# Patient Record
Sex: MALE | Race: WHITE
[De-identification: names, ages, dates, MRNs, and addresses within clinical notes are randomized per-mention and may not be internally consistent; named-entity substitution may affect disease eponyms.]

---

## 2017-03-22 ENCOUNTER — HOSPITAL ENCOUNTER (OUTPATIENT)
Dept: HOSPITAL 17 - CLAB | Age: 82
End: 2017-03-22
Attending: INTERNAL MEDICINE
Payer: MEDICARE

## 2017-03-22 DIAGNOSIS — J42: Primary | ICD-10-CM

## 2017-03-22 PROCEDURE — 87206 SMEAR FLUORESCENT/ACID STAI: CPT

## 2017-03-22 PROCEDURE — 87015 SPECIMEN INFECT AGNT CONCNTJ: CPT

## 2017-03-22 PROCEDURE — 87205 SMEAR GRAM STAIN: CPT

## 2017-03-22 PROCEDURE — 87116 MYCOBACTERIA CULTURE: CPT

## 2017-03-22 PROCEDURE — 87070 CULTURE OTHR SPECIMN AEROBIC: CPT

## 2017-08-29 ENCOUNTER — HOSPITAL ENCOUNTER (OUTPATIENT)
Dept: HOSPITAL 17 - CLAB | Age: 82
End: 2017-08-29
Attending: INTERNAL MEDICINE
Payer: MEDICARE

## 2017-08-29 DIAGNOSIS — B96.5: ICD-10-CM

## 2017-08-29 DIAGNOSIS — B95.3: ICD-10-CM

## 2017-08-29 DIAGNOSIS — J42: Primary | ICD-10-CM

## 2017-08-29 PROCEDURE — 87186 SC STD MICRODIL/AGAR DIL: CPT

## 2017-08-29 PROCEDURE — 87077 CULTURE AEROBIC IDENTIFY: CPT

## 2017-08-29 PROCEDURE — 87205 SMEAR GRAM STAIN: CPT

## 2017-08-29 PROCEDURE — 87070 CULTURE OTHR SPECIMN AEROBIC: CPT

## 2017-09-05 ENCOUNTER — HOSPITAL ENCOUNTER (OUTPATIENT)
Dept: HOSPITAL 17 - HRSP | Age: 82
End: 2017-09-05
Attending: INTERNAL MEDICINE
Payer: MEDICARE

## 2017-09-05 DIAGNOSIS — J45.909: Primary | ICD-10-CM

## 2017-09-05 PROCEDURE — 94729 DIFFUSING CAPACITY: CPT

## 2017-09-05 PROCEDURE — 94726 PLETHYSMOGRAPHY LUNG VOLUMES: CPT

## 2017-09-05 PROCEDURE — 94620: CPT

## 2017-09-05 PROCEDURE — 94060 EVALUATION OF WHEEZING: CPT

## 2017-09-05 PROCEDURE — 95012 NITRIC OXIDE EXP GAS DETER: CPT

## 2017-09-13 NOTE — RSPPFT
DATE OF PROCEDURE:   9/5/17



COMMENTS:   



VOLUMES

DYNAMIC:    FVC mildly reduced; FEV1 moderately reduced.

STATIC:    TLC moderately reduced; FRC mildly reduced; RV normal.

FLOWS:    FEV1% moderately reduced; FEF 25-75 severely reduced.

DIFFUSION:    Moderately reduced.  

FLOW VOLUME

      LOOP:    Pattern of variable intrathoracic airways obstruction.



IMPRESSION:    

   

Moderately severe obstructive ventilator defect with reduction in diffusion. There is also 
a significant restrictive defect with a TLC of only 52%. There is improvement 
post-bronchodilator.

## 2017-10-23 ENCOUNTER — HOSPITAL ENCOUNTER (OUTPATIENT)
Dept: HOSPITAL 17 - CLAB | Age: 82
End: 2017-10-23
Attending: INTERNAL MEDICINE
Payer: MEDICARE

## 2017-10-23 DIAGNOSIS — J40: Primary | ICD-10-CM

## 2017-10-23 PROCEDURE — 87205 SMEAR GRAM STAIN: CPT

## 2017-10-23 PROCEDURE — 87070 CULTURE OTHR SPECIMN AEROBIC: CPT

## 2018-03-16 ENCOUNTER — HOSPITAL ENCOUNTER (OUTPATIENT)
Dept: HOSPITAL 17 - CLAB | Age: 83
End: 2018-03-16
Attending: INTERNAL MEDICINE
Payer: MEDICARE

## 2018-03-16 DIAGNOSIS — B96.89: ICD-10-CM

## 2018-03-16 DIAGNOSIS — J47.9: Primary | ICD-10-CM

## 2018-03-16 PROCEDURE — 87206 SMEAR FLUORESCENT/ACID STAI: CPT

## 2018-03-16 PROCEDURE — 87077 CULTURE AEROBIC IDENTIFY: CPT

## 2018-03-16 PROCEDURE — 87205 SMEAR GRAM STAIN: CPT

## 2018-03-16 PROCEDURE — 87070 CULTURE OTHR SPECIMN AEROBIC: CPT

## 2018-03-16 PROCEDURE — 87015 SPECIMEN INFECT AGNT CONCNTJ: CPT

## 2018-03-16 PROCEDURE — 87186 SC STD MICRODIL/AGAR DIL: CPT

## 2018-03-16 PROCEDURE — 87116 MYCOBACTERIA CULTURE: CPT

## 2018-03-16 PROCEDURE — 87102 FUNGUS ISOLATION CULTURE: CPT

## 2018-06-05 ENCOUNTER — HOSPITAL ENCOUNTER (INPATIENT)
Dept: HOSPITAL 17 - NEPE | Age: 83
LOS: 3 days | Discharge: HOME | DRG: 190 | End: 2018-06-08
Attending: FAMILY MEDICINE | Admitting: FAMILY MEDICINE
Payer: MEDICARE

## 2018-06-05 VITALS
HEART RATE: 78 BPM | DIASTOLIC BLOOD PRESSURE: 54 MMHG | OXYGEN SATURATION: 97 % | SYSTOLIC BLOOD PRESSURE: 92 MMHG | RESPIRATION RATE: 17 BRPM

## 2018-06-05 VITALS
SYSTOLIC BLOOD PRESSURE: 102 MMHG | TEMPERATURE: 98.1 F | OXYGEN SATURATION: 99 % | DIASTOLIC BLOOD PRESSURE: 55 MMHG | RESPIRATION RATE: 18 BRPM | HEART RATE: 70 BPM

## 2018-06-05 VITALS
SYSTOLIC BLOOD PRESSURE: 102 MMHG | HEART RATE: 84 BPM | OXYGEN SATURATION: 96 % | TEMPERATURE: 98.9 F | DIASTOLIC BLOOD PRESSURE: 48 MMHG | RESPIRATION RATE: 20 BRPM

## 2018-06-05 VITALS
DIASTOLIC BLOOD PRESSURE: 55 MMHG | SYSTOLIC BLOOD PRESSURE: 82 MMHG | OXYGEN SATURATION: 96 % | TEMPERATURE: 99.3 F | RESPIRATION RATE: 19 BRPM | HEART RATE: 82 BPM

## 2018-06-05 VITALS — WEIGHT: 263.45 LBS | HEIGHT: 72 IN | BODY MASS INDEX: 35.68 KG/M2

## 2018-06-05 VITALS
HEART RATE: 79 BPM | OXYGEN SATURATION: 97 % | DIASTOLIC BLOOD PRESSURE: 56 MMHG | RESPIRATION RATE: 18 BRPM | SYSTOLIC BLOOD PRESSURE: 105 MMHG

## 2018-06-05 VITALS — OXYGEN SATURATION: 99 %

## 2018-06-05 DIAGNOSIS — M54.16: ICD-10-CM

## 2018-06-05 DIAGNOSIS — I25.10: ICD-10-CM

## 2018-06-05 DIAGNOSIS — E78.5: ICD-10-CM

## 2018-06-05 DIAGNOSIS — J44.0: Primary | ICD-10-CM

## 2018-06-05 DIAGNOSIS — R91.1: ICD-10-CM

## 2018-06-05 DIAGNOSIS — M85.80: ICD-10-CM

## 2018-06-05 DIAGNOSIS — Z96.1: ICD-10-CM

## 2018-06-05 DIAGNOSIS — D69.6: ICD-10-CM

## 2018-06-05 DIAGNOSIS — B96.5: ICD-10-CM

## 2018-06-05 DIAGNOSIS — J44.1: ICD-10-CM

## 2018-06-05 DIAGNOSIS — Z79.01: ICD-10-CM

## 2018-06-05 DIAGNOSIS — Z95.0: ICD-10-CM

## 2018-06-05 DIAGNOSIS — A31.9: ICD-10-CM

## 2018-06-05 DIAGNOSIS — Z86.74: ICD-10-CM

## 2018-06-05 DIAGNOSIS — I95.9: ICD-10-CM

## 2018-06-05 DIAGNOSIS — E80.4: ICD-10-CM

## 2018-06-05 DIAGNOSIS — M15.9: ICD-10-CM

## 2018-06-05 DIAGNOSIS — Z87.891: ICD-10-CM

## 2018-06-05 DIAGNOSIS — D53.9: ICD-10-CM

## 2018-06-05 DIAGNOSIS — I35.0: ICD-10-CM

## 2018-06-05 DIAGNOSIS — Z98.41: ICD-10-CM

## 2018-06-05 DIAGNOSIS — E78.00: ICD-10-CM

## 2018-06-05 DIAGNOSIS — G47.33: ICD-10-CM

## 2018-06-05 DIAGNOSIS — J15.1: ICD-10-CM

## 2018-06-05 DIAGNOSIS — K21.9: ICD-10-CM

## 2018-06-05 DIAGNOSIS — M54.12: ICD-10-CM

## 2018-06-05 DIAGNOSIS — R63.4: ICD-10-CM

## 2018-06-05 DIAGNOSIS — Z98.42: ICD-10-CM

## 2018-06-05 DIAGNOSIS — I27.20: ICD-10-CM

## 2018-06-05 DIAGNOSIS — M51.9: ICD-10-CM

## 2018-06-05 DIAGNOSIS — I48.2: ICD-10-CM

## 2018-06-05 LAB
ALBUMIN SERPL-MCNC: 2.9 GM/DL (ref 3.4–5)
ALP SERPL-CCNC: 62 U/L (ref 45–117)
ALT SERPL-CCNC: 14 U/L (ref 12–78)
AST SERPL-CCNC: 9 U/L (ref 15–37)
BASOPHILS # BLD AUTO: 0 TH/MM3 (ref 0–0.2)
BASOPHILS NFR BLD: 0.2 % (ref 0–2)
BILIRUB INDIRECT SERPL-MCNC: 1 MG/DL (ref 0–0.8)
BILIRUB SERPL-MCNC: 1.5 MG/DL (ref 0.2–1)
BUN SERPL-MCNC: 41 MG/DL (ref 7–18)
CALCIUM SERPL-MCNC: 8.1 MG/DL (ref 8.5–10.1)
CHLORIDE SERPL-SCNC: 106 MEQ/L (ref 98–107)
COLOR UR: YELLOW
CREAT SERPL-MCNC: 1.81 MG/DL (ref 0.6–1.3)
DIRECT BILIRUBIN ADULT: 0.5 MG/DL (ref 0–0.2)
EOSINOPHIL # BLD: 0 TH/MM3 (ref 0–0.4)
EOSINOPHIL NFR BLD: 0.1 % (ref 0–4)
ERYTHROCYTE [DISTWIDTH] IN BLOOD BY AUTOMATED COUNT: 18.2 % (ref 11.6–17.2)
GFR SERPLBLD BASED ON 1.73 SQ M-ARVRAT: 36 ML/MIN (ref 89–?)
GLUCOSE SERPL-MCNC: 121 MG/DL (ref 74–106)
GLUCOSE UR STRIP-MCNC: (no result) MG/DL
HCO3 BLD-SCNC: 21.5 MEQ/L (ref 21–32)
HCT VFR BLD CALC: 23 % (ref 39–51)
HGB BLD-MCNC: 7.8 GM/DL (ref 13–17)
HGB UR QL STRIP: (no result)
INR PPP: 1.2 RATIO
KETONES UR STRIP-MCNC: (no result) MG/DL
LYMPHOCYTES # BLD AUTO: 1.1 TH/MM3 (ref 1–4.8)
LYMPHOCYTES NFR BLD AUTO: 10.5 % (ref 9–44)
MCH RBC QN AUTO: 35.4 PG (ref 27–34)
MCHC RBC AUTO-ENTMCNC: 34 % (ref 32–36)
MCV RBC AUTO: 104.1 FL (ref 80–100)
MONOCYTE #: 1.4 TH/MM3 (ref 0–0.9)
MONOCYTES NFR BLD: 13.4 % (ref 0–8)
MUCOUS THREADS #/AREA URNS LPF: (no result) /LPF
NEUTROPHILS # BLD AUTO: 7.7 TH/MM3 (ref 1.8–7.7)
NEUTROPHILS NFR BLD AUTO: 75.8 % (ref 16–70)
NITRITE UR QL STRIP: (no result)
PLATELET # BLD: 158 TH/MM3 (ref 150–450)
PMV BLD AUTO: 9.7 FL (ref 7–11)
PROT SERPL-MCNC: 6.2 GM/DL (ref 6.4–8.2)
PROTHROMBIN TIME: 12 SEC (ref 9.8–11.6)
RBC # BLD AUTO: 2.21 MIL/MM3 (ref 4.5–5.9)
SODIUM SERPL-SCNC: 139 MEQ/L (ref 136–145)
SP GR UR STRIP: 1.01 (ref 1–1.03)
TROPONIN I SERPL-MCNC: 0.04 NG/ML (ref 0.02–0.05)
URINE LEUKOCYTE ESTERASE: (no result)
WBC # BLD AUTO: 10.2 TH/MM3 (ref 4–11)

## 2018-06-05 PROCEDURE — 80048 BASIC METABOLIC PNL TOTAL CA: CPT

## 2018-06-05 PROCEDURE — 86920 COMPATIBILITY TEST SPIN: CPT

## 2018-06-05 PROCEDURE — 87015 SPECIMEN INFECT AGNT CONCNTJ: CPT

## 2018-06-05 PROCEDURE — 85025 COMPLETE CBC W/AUTO DIFF WBC: CPT

## 2018-06-05 PROCEDURE — 80053 COMPREHEN METABOLIC PANEL: CPT

## 2018-06-05 PROCEDURE — 84484 ASSAY OF TROPONIN QUANT: CPT

## 2018-06-05 PROCEDURE — 71045 X-RAY EXAM CHEST 1 VIEW: CPT

## 2018-06-05 PROCEDURE — 94664 DEMO&/EVAL PT USE INHALER: CPT

## 2018-06-05 PROCEDURE — 81001 URINALYSIS AUTO W/SCOPE: CPT

## 2018-06-05 PROCEDURE — 87040 BLOOD CULTURE FOR BACTERIA: CPT

## 2018-06-05 PROCEDURE — 94668 MNPJ CHEST WALL SBSQ: CPT

## 2018-06-05 PROCEDURE — 87070 CULTURE OTHR SPECIMN AEROBIC: CPT

## 2018-06-05 PROCEDURE — 82550 ASSAY OF CK (CPK): CPT

## 2018-06-05 PROCEDURE — 36430 TRANSFUSION BLD/BLD COMPNT: CPT

## 2018-06-05 PROCEDURE — 86900 BLOOD TYPING SEROLOGIC ABO: CPT

## 2018-06-05 PROCEDURE — 86901 BLOOD TYPING SEROLOGIC RH(D): CPT

## 2018-06-05 PROCEDURE — 87205 SMEAR GRAM STAIN: CPT

## 2018-06-05 PROCEDURE — 71250 CT THORAX DX C-: CPT

## 2018-06-05 PROCEDURE — 83605 ASSAY OF LACTIC ACID: CPT

## 2018-06-05 PROCEDURE — 85610 PROTHROMBIN TIME: CPT

## 2018-06-05 PROCEDURE — 86850 RBC ANTIBODY SCREEN: CPT

## 2018-06-05 PROCEDURE — 93005 ELECTROCARDIOGRAM TRACING: CPT

## 2018-06-05 PROCEDURE — 85730 THROMBOPLASTIN TIME PARTIAL: CPT

## 2018-06-05 PROCEDURE — 87116 MYCOBACTERIA CULTURE: CPT

## 2018-06-05 PROCEDURE — 96361 HYDRATE IV INFUSION ADD-ON: CPT

## 2018-06-05 PROCEDURE — 94640 AIRWAY INHALATION TREATMENT: CPT

## 2018-06-05 PROCEDURE — 87206 SMEAR FLUORESCENT/ACID STAI: CPT

## 2018-06-05 PROCEDURE — 80076 HEPATIC FUNCTION PANEL: CPT

## 2018-06-05 PROCEDURE — 96360 HYDRATION IV INFUSION INIT: CPT

## 2018-06-05 PROCEDURE — P9016 RBC LEUKOCYTES REDUCED: HCPCS

## 2018-06-05 PROCEDURE — 94667 MNPJ CHEST WALL 1ST: CPT

## 2018-06-05 PROCEDURE — 30233N1 TRANSFUSION OF NONAUTOLOGOUS RED BLOOD CELLS INTO PERIPHERAL VEIN, PERCUTANEOUS APPROACH: ICD-10-PCS | Performed by: EMERGENCY MEDICINE

## 2018-06-05 RX ADMIN — APIXABAN SCH MG: 5 TABLET, FILM COATED ORAL at 21:01

## 2018-06-05 RX ADMIN — IPRATROPIUM BROMIDE AND ALBUTEROL SULFATE SCH AMPULE: .5; 3 SOLUTION RESPIRATORY (INHALATION) at 20:07

## 2018-06-05 RX ADMIN — Medication SCH ML: at 19:25

## 2018-06-05 RX ADMIN — BUDESONIDE AND FORMOTEROL FUMARATE DIHYDRATE SCH PUFF: 160; 4.5 AEROSOL RESPIRATORY (INHALATION) at 21:01

## 2018-06-05 NOTE — MH
cc:

Liam Esparza MD, Jason R MD

****

 

 

DATE OF ADMISSION:

06/05/2018

 

ADMITTING DIAGNOSIS:

Anemia, generalized weakness and fever.

 

HISTORY OF PRESENT ILLNESS:

This 83-year-old white male has a history of chronic chest congestion 

and cough, as well as aortic stenosis, chronic atrial fibrillation, 

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  The 

patient was seen yesterday in the undersigned physician's office and 

was not complaining of anything new.  He was complaining of his 

persistent congestion, which seemed to actually have been improved 

recently after he completed a course of tobramycin nebulized solution.

 The patient, however, overnight he woke up feeling very weak.  He had

shaking chills.  He went to do a nebulizer treatment and sat in a 

chair.  He was unable to get up from the chair, so he contacted EMS 

and, at that time, EMS arrived.  They took his temperature and it was 

101 degrees Fahrenheit.  They did help him up to bed, but he refused 

to go to the emergency department.  In the morning, the patient  

reported that he felt weak and he went back to his chair and was still

unable to get up out of the chair without assistance.  At that time, 

home health came to see the patient and the home health nurse reported

to the undersigned physician the patient's change in condition, at 

which point, the patient was instructed to contact EMS and be 

transferred to this facility for further evaluation and treatment.  

The patient reports that he has had this persistent cough, but denies 

any shortness of breath.  He has had no chest pain, palpitations, 

lightheadedness, dizziness, nausea, vomiting.  His bowels are moving 

well without any diarrhea or constipation.  He has had no melena or 

hematochezia.  He denies any GERD symptoms.  He does have his diffuse 

arthritic pains in the shoulders, knees, and wrists.  He also has 

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 

regular basis.  He does receive a Burans patch, which he applies every

7 days to help with the pain.  The patient has had no lateralizing 

deficits.  No syncope, near syncope, visual changes, difficulty 

swallowing or difficulty speaking.

 

The patient does have a history of anemia, which was a macrocytic 

anemia.  He underwent a complete evaluation by Dr. Rust including

a bone marrow biopsy, but there was no definite underlying etiology.  

He was recently initiated on Procrit.  He has received 2 doses and was

scheduled for repeat blood work in the next week.  The patient has had

a significant weight loss of over 20 pounds in the last 6 months.  He 

states his appetite has been diminished.  He has felt weaker overall 

over the last 6 months.

 

PAST MEDICAL HISTORY:

Significant for chronic atrial fibrillation.  He is status post a 

failed cardioversion.  He is followed by Dr. Marrero. He has a dual 

chamber pacemaker due to a history of asystole.  He has moderate to 

severe aortic stenosis and has been recommended to undergo evaluation 

for possible aortic valve repair; however, this has not been 

accomplished due to the patient's persistent lung infection.  He has 

COPD with bronchiectasis.  The patient is followed by Dr. Nathan Deng

for pulmonology.  He did have a sputum culture done on 03/16/2018 

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  The patient recently was treated with aerosolized 

tobramycin and was to start ciprofloxacin today.  He has lumbar disk 

disease and DJD as well as cervical disk disease and DJD.  He has 

undergone epidural steroid injections in the past.  He has a history 

of hyperlipidemia, sleep apnea which he was diagnosed with in 2003.  

He is followed by Dr. Camilo.  He has not recently been using his CPAP 

regularly.  He has a history of Gilbert's disease with a chronically 

elevated total bilirubin, hypogonadism, osteoarthritis, chronic 

thrombocytopenia, osteopenia, history of compression fracture of the 

spine, pulmonary hypertension,

and primary generalized osteoarthritis.  He is status post a sinus 

surgery, status post tonsillectomy,  status post right toe surgery and

he had bilateral cataract removal with lens implants.

 

CURRENT MEDICATIONS:

1.  Voltaren gel 1% 4 grams applied to the affected joints, 3 times a 

day as needed

2.  Butrans patch 10 mcg per hour, apply 1 patch every 7 days.

3.  Hydrocodone/acetaminophen 10/325 mg 1/2 tablet 3 times a day as 

needed for breakthrough pain.

4.  Advair Diskus 500/50 mcg 1 inhalation twice daily.

5.  Rosuvastatin 5 mg daily.

6.  Torsemide 20 mg daily.

7.  Incruse Ellipta 62.5 mcg per inhalation, 1 inhalation daily.

8.  Mucinex 600 mg once a day.

9.  Proventil inhaler 2 puffs 4 times a day as needed.

10.  Eliquis 5 mg twice a day.

11.  Potassium chloride ER 20 mEq daily.

12.  Singulair 10 mg daily.

13.  Loratadine 10 mg daily.

14.  Albuterol ipratropium bromide nebulizer solution 2.5 - 0.5 mg/3 

mL, 3 mL 4 times a day as needed.

15.  Vitamin D 2000 International Units daily.

16.  Centrum Silver 1 tablet daily

 

ALLERGIES:

HE HAS NO KNOWN DRUG ALLERGIES.

 

FAMILY HISTORY:

Noncontributory.

 

SOCIAL HISTORY:

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  He was in the

[employer] in the past.  He was also an .  He is 

currently  and lives with his wife.  He smoked for many years, 

but has not smoked in over 10 years and he currently does not consume 

alcohol.

 

REVIEW OF SYSTEMS:

Negative except as outlined above.

 

PHYSICAL EXAMINATION:

VITAL SIGNS:  Upon arrival to the emergency department, the patient's 

blood pressure was 102/55 with a heart rate of 70, respirations 18, 

temperature 98.1 degrees Fahrenheit, oxygen saturation on room air is 

99%.

GENERAL:  This is a well-nourished, elderly white male lying in bed, 

appearing weak, but in no acute respiratory distress.

HEENT:  Pupils equal, round, react to light.  Extraocular muscles 

intact.  Sclerae are anicteric.  Conjunctivae are pale.  The bilateral

lens implant is in place.  Nares patent without discharge.  

Mouth/throat reveal moist mucous membranes.  No erythema or exudates. 

Dentition is good.

NECK:  Supple without lymphadenopathy, JVD, bruits or thyromegaly.

CARDIOVASCULAR:  Shows a regular rate and rhythm with a 3-4/6 systolic

murmur heard best at the right upper sternal border and a 2/6 systolic

murmur heard best at the left lower sternal border radiating to apex. 

There are distant heart sounds.

LUNGS:  Reveal diffuse rhonchi,  end expiratory wheezes, diminished 

air exchange, but no rales.

ABDOMEN:  Soft, nontender, nondistended with bowel sounds present.  No

mass felt.  No hepatosplenomegaly.

GENITOURINARY AND RECTAL:  Deferred.

LOWER EXTREMITIES:  Reveal 1+ edema of the distal lower legs and 

ankles, but no calf tenderness or Homans sign.  Intact distal pulses.

SKIN:  Warm and dry, but pale.  Turgor good.

NEUROLOGIC:  The patient is awake and alert, oriented x3.  Speech is 

intact.  Cranial nerves intact.  No lateralizing deficits.  No 

short-term memory or cognitive deficits.  Gait was not tested.  Mood 

good.  Affect appropriate.

 

LABORATORY STUDIES:

White blood cell count is 10.2, hemoglobin 7.8, hematocrit 23.0, MCV 

elevated at 104.1, platelet count 158,000 which is within normal 

limits.  The basic metabolic profile was significant for a BUN of 41, 

creatinine 1.81.  Calcium was low at 8.1, but this was not protein 

corrected.  Liver function studies revealed a total bilirubin of 1.5 

with an indirect bilirubin of 1.0.  The total protein was low at 6.2, 

albumin low at 2.9.  CK is 34.  Troponin 0.04.  INR 1.2, APTT 34.5.  

Urinalysis revealed a specific gravity 1.013, pH 5.5, trace leukocyte 

esterase, 1 RBC, 3 WBCs.  Culture not indicated.  Blood cultures 

pending.

 

IMAGING STUDIES:

Chest x-ray reveals single AP view,  lungs are grossly clear.

 

ASSESSMENT AND PLAN:

1.  This 83-year-old white male presents to the emergency department 

with profound weakness, fever up to 101 degrees yesterday and anemia. 

At this point, the patient has been ordered 1 unit of packed red blood

cells by the emergency room physician.  We will transfuse the 1 unit 

and possibly a second tomorrow.  The patient has had a temperature up 

to 101 degrees Fahrenheit, but the urine is negative.  The only likely

source is his lungs.  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 

mycobacterium infection of the lungs as well as bronchiectasis with 

Pseudomonas infection.  I have consulted Dr. Nathan Deng to assist 

with management of his pulmonary condition and I have consulted 

infectious disease for assistance with a choice of antibiotics.  We 

will place the patient on nebulizer treatments and IV steroids.  He 

currently has good oxygen saturations so will not need any 

supplemental oxygen.

2.  Anemia.  The patient underwent an extensive evaluation for this 

macrocytic anemia and had just started Procrit injections.  We will 

transfuse the 1 unit of packed red blood cells and possibly a second 

tomorrow.

3.  Chronic atrial fibrillation.  The patient's ventricular rate is 

well controlled.  He continues on Eliquis for prophylaxis against 

embolism. I have consulted Dr. Marrero for assistance with management 

of his cardiac disease.

4.  Aortic stenosis.  The patient has moderate to severe aortic 

stenosis.  He will continue with the torsemide.  Blood pressure was 

low earlier today with systolic readings in the 80s and 90s.  We will 

monitor blood pressure as the patient receives the transfusion.  We 

will defer any further treatment of the aortic stenosis to Dr. Marrero.

5.  Cervical lumbar disk disease and primary generalized 

osteoarthritis.  The patient continues with the Butrans patch. His 

patch is due to be changed on Sunday, so we will not need to change at

this time.  I have held any medication for breakthrough pain until we 

assess the patient's respiratory status better.

6.  Hyperlipidemia.  The patient normally takes rosuvastatin.  He will

be changed to pravastatin during the hospitalization, due to hospital 

formulary restrictions.

7.  Chronic obstructive pulmonary disease.  The patient is going to 

continue with Incruse and Advair.  I have placed him on the steroids 

and we will await Dr. Deng's input to determine if any additional 

treatment is needed.  We will also speak with infectious disease about

antibiotic choice.

 

I have explained the patient's condition and the plan of care to the 

patient.  He expressed understanding and agreement.

 

 

__________________________________

MD ULISES Mckeon/

D: 06/05/2018, 06:50 PM

T: 06/05/2018, 07:33 PM

Visit #: [id number]

Job #: [id number]

## 2018-06-05 NOTE — PD
HPI


Chief Complaint:  General Weakness


Time Seen by Provider:  15:19


Travel History


International Travel<30 days:  No


Contact w/Intl Traveler<30days:  No


Traveled to known affect area:  No





History of Present Illness


HPI


84 y/o male presents with generalized weakness.  His son states while he was at 

work the patient had difficulty getting into bed he was so weak so they called 

the fire department who helped assist in the patient refused transport.  He 

states he was just notified of this and given his weakness has progressed he 

elected to bring him here.  The patient states he also feels short of breath.  

He states he is currently on ciprofloxacin for treatment of an outpatient 

pneumonia after finishing inhaled antibiotics.  He states he is supposed to be 

on the antibiotics for 2 weeks and just started this yesterday.  He states he 

has had history of low blood count before and received Procrit and repeat 

hemoglobin had gone up from 8 to about 9-1/2.  He states this was a couple 

months ago.  He denies any active bleeding.  His son states that he had a fever 

over 101 last night.  History is supplemented from the son but still limited.





PFSH


Past Medical History


Arthritis:  Yes


Asthma:  Yes


Autoimmune Disease:  No


Blood Disorders:  Yes (Anemia)


Heart Rhythm Problems:  Yes


Cancer:  No


Cardiovascular Problems:  Yes (Afib and Pacemaker)


High Cholesterol:  Yes


Chest Pain:  No


Congestive Heart Failure:  No


COPD:  Yes


Cerebrovascular Accident:  No


Coronary Artery Disease:  Yes


Diabetes:  No


Diminished Hearing:  No


Endocrine:  No


Gastrointestinal Disorders:  No


GERD:  Yes


Glaucoma:  No


Genitourinary:  No


Headaches:  No


Hepatitis:  No


Hiatal Hernia:  Yes


Hypertension:  No


Musculoskeletal:  Yes


Respiratory:  Yes (COPD)


Integumentary:  No


Myocardial Infarction:  No


Seizures:  No


Sleep Apnea:  Yes


Thyroid Disease:  No


Ulcer:  No


Pregnant?:  Not Pregnant





Past Surgical History


AICD:  No


Oral Surgery:  Yes (CAUDWELL HELENA SINUS SURGERY 20 YEARSD AGO)


Pacemaker:  Yes


Tonsillectomy:  Yes


Other Surgery:  Yes (PM)





Social History


Alcohol Use:  No


Tobacco Use:  No (FORMER)


Substance Use:  No





Allergies-Medications


(Allergen,Severity, Reaction):  


Coded Allergies:  


     No Known Allergies (Verified , 9/28/13)


Reported Meds & Prescriptions





Reported Meds & Active Scripts


Active


Reported


Guaifenesin ER (Guaifenesin) 600 Mg Tab.er.12h 600 Mg PO DAILY


Claritin (Loratadine) 10 Mg Tablet 10 Mg PO DAILY


Duoneb (Ipratropium-Albuterol Neb) 0.5-2.5 Mg/3 Ml Neb 3 Ml NEB Q6HR PRN


Proventil Hfa 6.7 GM Inh (Albuterol Sulfate) 90 Mcg/Act Aer 2 Puff INH Q6HR PRN


Vitamin D (Cholecalciferol) 2,000 Unit Tab 2,000 Units PO DAILY


Centrum Silver Adult 50+ (Multiple Vitamins W/ Minerals) 0.4 Mg-300 Mcg-250 Mcg 

Tab 1 Tab PO DAILY


Eliquis (Apixaban) 5 Mg Tab 5 Mg PO BID


Norco (Hydrocodone-Acetaminophen)  Mg Tab 0.5 Tab PO Q8HR PRN


Fortesta Topical (Testosterone) 10 Mg/0.5 Gm Gel 10 Mg TOPICAL DAILY


     10 mg/actuation


Butrans Patch 168 HR (Buprenorphine Patch 168 HR) 10 Mcg/Hr Patch 1 Patch T-

DERMAL Q7D


Voltaren (Diclofenac Sodium) 1 % Gel..gram. 1 Applic TOPICAL TID


     Apply to affected joints


Advair Diskus Inh (Fluticasone-Salmeterol Inh) 500-50 Mcg/Blist Aer 1 Puff INH 

BID


     Rinse mouth after use.


Crestor (Rosuvastatin Calcium) 5 Mg Tab 5 Mg PO DAILY


Potassium Chloride ER (Potassium Chloride) 20 Meq Tab 20 Meq PO DAILY


Demadex (Torsemide) 20 Mg Tab 20 Mg PO DAILY


Singulair (Montelukast Sodium) 10 Mg Tab 10 Mg PO HS


Incruse Ellipta Inh (Umeclidinium Bromide Inh) 0.0625 Mg/Act Inh 1 Puff INH 

DAILY








Review of Systems


Except as stated in HPI:  all other systems reviewed are Neg





Physical Exam


Narrative


GENERAL: 84 y/o male in no apparent distress


SKIN: Focused skin assessment warm/dry.


HEAD: Atraumatic. Normocephalic. 


EYES: Pupils equal and round. No scleral icterus. No injection or drainage. 


ENT: No nasal bleeding or discharge.  Mucous membranes pink and moist.


NECK: Trachea midline. 


CARDIOVASCULAR: Regular rate and rhythm.  


RESPIRATORY: No accessory muscle use.  Mild expiratory wheezing bilaterally 

bilaterally. 


GASTROINTESTINAL: Abdomen soft, non-tender, nondistended. Hepatic and splenic 

margins not palpable. 


MUSCULOSKELETAL: No obvious deformities. No clubbing.  No cyanosis.  


NEUROLOGICAL: Awake.  Moves all extremities but generalized weakness noted 

throughout. Normal speech.


PSYCHIATRIC: Appropriate mood and affect; insight and judgment normal.


RECTAL EXAM: Performed with chaperone and after permission. No external 

hemorrhoid or fissure, stool is brown, non-bloody.





Data


Data


Last Documented VS





Vital Signs








  Date Time  Temp Pulse Resp B/P (MAP) Pulse Ox O2 Delivery O2 Flow Rate FiO2


 


6/5/18 15:18  78 17 92/54 (67) 97 Room Air  


 


6/5/18 13:23 98.1       








Orders





 Orders


Electrocardiogram (6/5/18 13:53)


Complete Blood Count With Diff (6/5/18 13:53)


Basic Metabolic Panel (Bmp) (6/5/18 13:53)


Ckmb (Isoenzyme) Profile (6/5/18 13:53)


Troponin I (6/5/18 13:53)


Chest, Single Ap (6/5/18 13:53)


Iv Access Insert/Monitor (6/5/18 13:53)


Ecg Monitoring (6/5/18 13:53)


Oximetry (6/5/18 13:53)


Coag Profile (6/5/18 14:02)


Sodium Chlorid 0.9% 500 Ml Inj (Ns 500 M (6/5/18 15:30)


Red Blood Cells (Rbc) (6/5/18 15:21)


Blood Product Administration (6/5/18 15:21)


Sodium Chlor 0.9% 250 Ml Inj (Ns 250 Ml (6/5/18 15:30)


Urinalysis - C+S If Indicated (6/5/18 15:21)


Type And Screen (6/5/18 15:21)


Lactic Acid (6/5/18 15:28)


Hepatic Functional Panel (6/5/18 15:29)


Blood Culture (6/5/18 15:49)


Cath For Specimen (6/5/18 17:06)


Admit Order (Ed Use Only) (6/5/18 17:56)





Labs





Laboratory Tests








Test


  6/5/18


13:52 6/5/18


15:38 6/5/18


15:39 6/5/18


17:22


 


White Blood Count 10.2 TH/MM3    


 


Red Blood Count 2.21 MIL/MM3    


 


Hemoglobin 7.8 GM/DL    


 


Hematocrit 23.0 %    


 


Mean Corpuscular Volume 104.1 FL    


 


Mean Corpuscular Hemoglobin 35.4 PG    


 


Mean Corpuscular Hemoglobin


Concent 34.0 % 


  


  


  


 


 


Red Cell Distribution Width 18.2 %    


 


Platelet Count 158 TH/MM3    


 


Mean Platelet Volume 9.7 FL    


 


Neutrophils (%) (Auto) 75.8 %    


 


Lymphocytes (%) (Auto) 10.5 %    


 


Monocytes (%) (Auto) 13.4 %    


 


Eosinophils (%) (Auto) 0.1 %    


 


Basophils (%) (Auto) 0.2 %    


 


Neutrophils # (Auto) 7.7 TH/MM3    


 


Lymphocytes # (Auto) 1.1 TH/MM3    


 


Monocytes # (Auto) 1.4 TH/MM3    


 


Eosinophils # (Auto) 0.0 TH/MM3    


 


Basophils # (Auto) 0.0 TH/MM3    


 


CBC Comment DIFF FINAL    


 


Differential Comment     


 


Prothrombin Time 12.0 SEC    


 


Prothromb Time International


Ratio 1.2 RATIO 


  


  


  


 


 


Activated Partial


Thromboplast Time 34.5 SEC 


  


  


  


 


 


Blood Urea Nitrogen 41 MG/DL    


 


Creatinine 1.81 MG/DL    


 


Random Glucose 121 MG/DL    


 


Calcium Level 8.1 MG/DL    


 


Sodium Level 139 MEQ/L    


 


Potassium Level 3.9 MEQ/L    


 


Chloride Level 106 MEQ/L    


 


Carbon Dioxide Level 21.5 MEQ/L    


 


Anion Gap 12 MEQ/L    


 


Estimat Glomerular Filtration


Rate 36 ML/MIN 


  


  


  


 


 


Total Creatine Kinase 34 U/L    


 


Troponin I 0.04 NG/ML    


 


Lactic Acid Level  1.6 mmol/L   


 


Total Bilirubin   1.5 MG/DL  


 


Direct Bilirubin   0.5 MG/DL  


 


Indirect Bilirubin   1.0 MG/DL  


 


Aspartate Amino Transf


(AST/SGOT) 


  


  9 U/L 


  


 


 


Alanine Aminotransferase


(ALT/SGPT) 


  


  14 U/L 


  


 


 


Alkaline Phosphatase   62 U/L  


 


Total Protein   6.2 GM/DL  


 


Albumin   2.9 GM/DL  


 


Urine Color    YELLOW 


 


Urine Turbidity    CLEAR 


 


Urine pH    5.5 


 


Urine Specific Gravity    1.013 


 


Urine Protein    NEG mg/dL 


 


Urine Glucose (UA)    NEG mg/dL 


 


Urine Ketones    NEG mg/dL 


 


Urine Occult Blood    NEG 


 


Urine Nitrite    NEG 


 


Urine Bilirubin    NEG 


 


Urine Urobilinogen    2.0 MG/DL 


 


Urine Leukocyte Esterase    TRACE 


 


Urine RBC    1 /hpf 


 


Urine WBC    3 /hpf 


 


Urine Mucus    FEW /lpf 


 


Microscopic Urinalysis Comment


  


  


  


  CULT NOT


INDICATED











MDM


Medical Decision Making


Medical Screen Exam Complete:  Yes


Emergency Medical Condition:  Yes


Medical Record Reviewed:  Yes ([facility] confirmed)


Interpretation(s)


CBC & BMP Diagram


6/5/18 13:52








Calcium Level 8.1 L








Last 24 hours Impressions








Chest X-Ray 6/5/18 1353 Signed





Impressions: 





 CONCLUSION: 





 Lungs are grossly clear.





  





 








Differential Diagnosis


Anemia, renal failure, UTI, pneumonia


Narrative Course


Will add on urinalysis and lactate and dose with 1 unit of blood after review 

of protocol ordered prior to arrival in the medical pod.





Lactate is normal.  Urine shows no signs of infection.  Patient updated and 

agrees to admission.  Will discuss with his physician.  Blood pressure stable.  

Agrees to transfusion


Critical Care Narrative


Aggregate critical care time was 35 minutes. Time to perform other separately 

billable procedures was not  


included in the critical care time. My time did not include minutes spent 

treating any other patients simultaneously or on  


activities that did not directly contribute to the patient's treatment.  





The services I provided to this patient were to treat and/or prevent clinically 

significant deterioration that could result  


in:  shock, death





I provided critical care services requiring my management, as noted below:


Chart data review, documentation time, medication orders and management, vital 

sign assessments/reviewing monitor data,  


ordering and reviewing lab tests, ordering and interpreting/reviewing x-rays 

and diagnostic studies, care of the patient  


and discussion of the patient with the admitting physicians.





[facility] Point of Care


Internal Pos. & Neg. Controls:  Passed


Fecal Specimen Occult Blood:  Negative





Physician Communication


Physician Communication


dr santiago agrees to admit





Diagnosis





 Primary Impression:  


 Anemia


 Qualified Codes:  D64.9 - Anemia, unspecified


 Additional Impressions:  


 Weakness


 Hypotension


 Qualified Codes:  I95.9 - Hypotension, unspecified





Admitting Information


Admitting Physician Requests:  Admit











Diana Lopez MD Jun 5, 2018 15:46

## 2018-06-05 NOTE — RADRPT
EXAM DATE:  6/5/2018 3:15 PM EDT

AGE/SEX:        83 years / Male



INDICATIONS:  Syncopal episode last night.



CLINICAL DATA:  This is the patient's initial encounter. Patient reports that signs and symptoms have
 been present for 1 day and indicates a pain score of 0/10. 

                                                                          

MEDICAL/SURGICAL HISTORY:       Asthma.  Chronic obstructive pulmonary disease. a-fib  Pacemaker.



COMPARISON:      [facility] [place], CHEST SINGLE AP, 9/28/2013.  .



FINDINGS:  

A single AP view of the chest demonstrates the lungs to be symmetrically aerated without evidence of 
mass, infiltrate or effusion.  The cardiomediastinal contours are unremarkable.  Osseous structures a
re intact. Left subclavian bipolar pacer in good position.  





CONCLUSION: 

Lungs are grossly clear.



Electronically signed by: Devonte Macias MD  6/5/2018 4:19 PM EDT

## 2018-06-06 VITALS
DIASTOLIC BLOOD PRESSURE: 60 MMHG | HEART RATE: 78 BPM | SYSTOLIC BLOOD PRESSURE: 112 MMHG | RESPIRATION RATE: 16 BRPM | TEMPERATURE: 97.6 F | OXYGEN SATURATION: 98 %

## 2018-06-06 VITALS
RESPIRATION RATE: 18 BRPM | DIASTOLIC BLOOD PRESSURE: 53 MMHG | TEMPERATURE: 97.3 F | SYSTOLIC BLOOD PRESSURE: 106 MMHG | OXYGEN SATURATION: 97 % | HEART RATE: 83 BPM

## 2018-06-06 VITALS
HEART RATE: 87 BPM | RESPIRATION RATE: 18 BRPM | SYSTOLIC BLOOD PRESSURE: 119 MMHG | OXYGEN SATURATION: 96 % | DIASTOLIC BLOOD PRESSURE: 56 MMHG | TEMPERATURE: 98.3 F

## 2018-06-06 VITALS
TEMPERATURE: 98.4 F | RESPIRATION RATE: 16 BRPM | DIASTOLIC BLOOD PRESSURE: 56 MMHG | OXYGEN SATURATION: 94 % | HEART RATE: 63 BPM | SYSTOLIC BLOOD PRESSURE: 116 MMHG

## 2018-06-06 VITALS
HEART RATE: 75 BPM | TEMPERATURE: 97.7 F | SYSTOLIC BLOOD PRESSURE: 101 MMHG | DIASTOLIC BLOOD PRESSURE: 59 MMHG | RESPIRATION RATE: 16 BRPM | OXYGEN SATURATION: 98 %

## 2018-06-06 VITALS
HEART RATE: 72 BPM | TEMPERATURE: 97.4 F | SYSTOLIC BLOOD PRESSURE: 93 MMHG | RESPIRATION RATE: 17 BRPM | OXYGEN SATURATION: 95 % | DIASTOLIC BLOOD PRESSURE: 51 MMHG

## 2018-06-06 VITALS
HEART RATE: 87 BPM | SYSTOLIC BLOOD PRESSURE: 119 MMHG | TEMPERATURE: 98.3 F | RESPIRATION RATE: 18 BRPM | DIASTOLIC BLOOD PRESSURE: 56 MMHG | OXYGEN SATURATION: 96 %

## 2018-06-06 VITALS
RESPIRATION RATE: 17 BRPM | DIASTOLIC BLOOD PRESSURE: 56 MMHG | HEART RATE: 76 BPM | SYSTOLIC BLOOD PRESSURE: 96 MMHG | TEMPERATURE: 97.7 F | OXYGEN SATURATION: 96 %

## 2018-06-06 VITALS
DIASTOLIC BLOOD PRESSURE: 62 MMHG | RESPIRATION RATE: 18 BRPM | OXYGEN SATURATION: 97 % | SYSTOLIC BLOOD PRESSURE: 117 MMHG | TEMPERATURE: 97.6 F | HEART RATE: 77 BPM

## 2018-06-06 VITALS — OXYGEN SATURATION: 97 %

## 2018-06-06 LAB
ALBUMIN SERPL-MCNC: 2.8 GM/DL (ref 3.4–5)
ALP SERPL-CCNC: 63 U/L (ref 45–117)
ALT SERPL-CCNC: 14 U/L (ref 12–78)
AST SERPL-CCNC: 11 U/L (ref 15–37)
BASOPHILS # BLD AUTO: 0 TH/MM3 (ref 0–0.2)
BASOPHILS NFR BLD: 0.1 % (ref 0–2)
BILIRUB SERPL-MCNC: 1.1 MG/DL (ref 0.2–1)
BUN SERPL-MCNC: 41 MG/DL (ref 7–18)
CALCIUM SERPL-MCNC: 8.7 MG/DL (ref 8.5–10.1)
CHLORIDE SERPL-SCNC: 108 MEQ/L (ref 98–107)
CREAT SERPL-MCNC: 1.61 MG/DL (ref 0.6–1.3)
EOSINOPHIL # BLD: 0 TH/MM3 (ref 0–0.4)
EOSINOPHIL NFR BLD: 0 % (ref 0–4)
ERYTHROCYTE [DISTWIDTH] IN BLOOD BY AUTOMATED COUNT: 19.6 % (ref 11.6–17.2)
GFR SERPLBLD BASED ON 1.73 SQ M-ARVRAT: 41 ML/MIN (ref 89–?)
GLUCOSE SERPL-MCNC: 206 MG/DL (ref 74–106)
HCO3 BLD-SCNC: 20.9 MEQ/L (ref 21–32)
HCT VFR BLD CALC: 25.2 % (ref 39–51)
HGB BLD-MCNC: 8.6 GM/DL (ref 13–17)
LYMPHOCYTES # BLD AUTO: 0.4 TH/MM3 (ref 1–4.8)
LYMPHOCYTES NFR BLD AUTO: 10.5 % (ref 9–44)
MCH RBC QN AUTO: 35.3 PG (ref 27–34)
MCHC RBC AUTO-ENTMCNC: 34.3 % (ref 32–36)
MCV RBC AUTO: 102.9 FL (ref 80–100)
MONOCYTE #: 0.1 TH/MM3 (ref 0–0.9)
MONOCYTES NFR BLD: 1.2 % (ref 0–8)
NEUTROPHILS # BLD AUTO: 3.7 TH/MM3 (ref 1.8–7.7)
NEUTROPHILS NFR BLD AUTO: 88.2 % (ref 16–70)
PLATELET # BLD: 143 TH/MM3 (ref 150–450)
PMV BLD AUTO: 9.7 FL (ref 7–11)
PROT SERPL-MCNC: 6.3 GM/DL (ref 6.4–8.2)
RBC # BLD AUTO: 2.45 MIL/MM3 (ref 4.5–5.9)
SODIUM SERPL-SCNC: 140 MEQ/L (ref 136–145)
WBC # BLD AUTO: 4.1 TH/MM3 (ref 4–11)

## 2018-06-06 RX ADMIN — APIXABAN SCH MG: 5 TABLET, FILM COATED ORAL at 20:51

## 2018-06-06 RX ADMIN — APIXABAN SCH MG: 5 TABLET, FILM COATED ORAL at 10:54

## 2018-06-06 RX ADMIN — IPRATROPIUM BROMIDE AND ALBUTEROL SULFATE SCH AMPULE: .5; 3 SOLUTION RESPIRATORY (INHALATION) at 16:11

## 2018-06-06 RX ADMIN — IPRATROPIUM BROMIDE AND ALBUTEROL SULFATE SCH AMPULE: .5; 3 SOLUTION RESPIRATORY (INHALATION) at 09:05

## 2018-06-06 RX ADMIN — POTASSIUM CHLORIDE SCH MEQ: 20 TABLET, EXTENDED RELEASE ORAL at 10:54

## 2018-06-06 RX ADMIN — TORSEMIDE SCH MG: 20 TABLET ORAL at 11:01

## 2018-06-06 RX ADMIN — IPRATROPIUM BROMIDE AND ALBUTEROL SULFATE SCH AMPULE: .5; 3 SOLUTION RESPIRATORY (INHALATION) at 02:56

## 2018-06-06 RX ADMIN — GUAIFENESIN SCH MG: 600 TABLET, EXTENDED RELEASE ORAL at 10:55

## 2018-06-06 RX ADMIN — IPRATROPIUM BROMIDE AND ALBUTEROL SULFATE SCH AMPULE: .5; 3 SOLUTION RESPIRATORY (INHALATION) at 21:41

## 2018-06-06 RX ADMIN — Medication SCH ML: at 11:01

## 2018-06-06 RX ADMIN — BUDESONIDE AND FORMOTEROL FUMARATE DIHYDRATE SCH PUFF: 160; 4.5 AEROSOL RESPIRATORY (INHALATION) at 20:52

## 2018-06-06 RX ADMIN — ATORVASTATIN CALCIUM SCH MG: 10 TABLET, FILM COATED ORAL at 10:54

## 2018-06-06 RX ADMIN — Medication SCH ML: at 20:51

## 2018-06-06 RX ADMIN — BUDESONIDE AND FORMOTEROL FUMARATE DIHYDRATE SCH PUFF: 160; 4.5 AEROSOL RESPIRATORY (INHALATION) at 10:55

## 2018-06-06 NOTE — HHI.PR
Subjective


Remarks


The patient is stronger after receiving 1 unit of packed red blood cells.  He 

was able to ambulate to the bathroom with a walker.  Denies any shortness of 

breath but complains of persistent moist cough.  Appetite is good.  Urine 

output is good.  Denies any chest pain, palpitations, lightheadedness, dizziness

, nausea or vomiting.








Current Medications








 Medications


  (Trade)  Dose


 Ordered  Sig/Mina


 Route  Start Time


 Stop Time Status Last Admin


 


  (NS Flush)  2 ml  UNSCH  PRN


 IV FLUSH  6/5/18 18:15


     


 


 


  (NS Flush)  2 ml  BID


 IV FLUSH  6/5/18 21:00


     


 


 


  (Tylenol)  650 mg  Q4H  PRN


 PO  6/5/18 18:15


     


 


 


  (Narcan Inj)  0.4 mg  UNSCH  PRN


 IV PUSH  6/5/18 18:15


     


 


 


  (Milk Of


 Magnesia Liq)  30 ml  Q12H  PRN


 PO  6/5/18 18:15


     


 


 


  (Senokot)  17.2 mg  Q12H  PRN


 PO  6/5/18 18:15


     


 


 


  (Dulcolax Supp)  10 mg  DAILY  PRN


 RECTAL  6/5/18 18:15


     


 


 


  (Eliquis)  5 mg  BID


 PO  6/5/18 21:00


    6/5/18 21:01


 


 


  (Mucinex Er)  600 mg  DAILY


 PO  6/6/18 09:00


     


 


 


  (KCl)  20 meq  DAILY


 PO  6/6/18 09:00


     


 


 


  (Demadex)  20 mg  DAILY


 PO  6/6/18 09:00


     


 


 


 Non-Formulary


 Medication  1 patch  Q7D


 T-DERMAL  6/5/18 18:30


   Future Hold  


 


 


  (Symbicort


 160-4.5 Mcg Inh)  1 puff  BID


 INH  6/5/18 21:00


    6/5/18 21:01


 


 


  (Lipitor)  10 mg  DAILY


 PO  6/6/18 09:00


     


 


 


 Non-Formulary


 Medication  1 puff  DAILY


 INH  6/6/18 09:00


   Future Hold  


 


 


  (Duoneb Neb)  1 ampule  Q6HR  NEB


 NEB  6/5/18 22:00


    6/5/18 20:07


 


 


  (SoluMEDROL INJ)  60 mg  Q12HR


 IV PUSH  6/5/18 21:00


    6/5/18 21:01


 











Objective





Vital Signs








  Date Time  Temp Pulse Resp B/P (MAP) Pulse Ox O2 Delivery O2 Flow Rate FiO2


 


6/6/18 04:00 97.4 72 17 93/51 (65) 95   


 


6/6/18 00:01 97.7 76 17 96/56 (69) 96   


 


6/5/18 20:09     99   21


 


6/5/18 19:22  79 18 105/56 (72) 97 Room Air  


 


6/5/18 18:30 99.3 82 19 82/55 96   


 


6/5/18 18:14 98.9 84 20 102/48 96   


 


6/5/18 15:18  78 17 92/54 (67) 97 Room Air  


 


6/5/18 13:23 98.1 70 18 102/55 (71) 99   














I/O      


 


 6/5/18 6/5/18 6/5/18 6/6/18 6/6/18 6/6/18





 07:00 15:00 23:00 07:00 15:00 23:00


 


Intake Total   564 ml 220 ml  


 


Output Total   300 ml 450 ml  


 


Balance   264 ml -230 ml  


 


      


 


Intake Oral    220 ml  


 


IV Total   564 ml   


 


Output Urine Total   300 ml 450 ml  


 


# Voids   2   








Cardiovascular: Irregular rate and rhythm with systolic murmur heard best at 

right upper sternal border


Lungs: Diffuse rhonchi without expiratory wheezes or rales


Abdomen: Soft, nontender nondistended.  Bowel sounds present.


Extremities: Trace edema in the bilateral ankles, no calf tenderness or Homans 

sign


Result Diagram:  


6/6/18 0638                                                                    

            6/6/18 0638





Imaging





Last 48 hours Impressions








Chest X-Ray 6/5/18 1353 Signed





Impressions: 





 CONCLUSION: 





 Lungs are grossly clear.





  





 


 


Chest CT 6/5/18 0000 Signed





Impressions: 





 CONCLUSION:





 1.  Patchy bilateral pulmonary infiltrates and emphysema identified.





 2.  There is a noncalcified pulmonary nodule in the left upper lobe abutting th





 e oblique fissure measuring 9 mm.





  





 











Assessment and Plan


Problem List:  


(1) Anemia


ICD Codes:  D64.9 - Anemia, unspecified


Status:  Chronic


Plan:  The patient has had a chronic macrocytic anemia.  He underwent 

hematologic evaluation by Dr. Rust.  Bone marrow biopsy was unremarkable.  

He had just recently begun Procrit injections.  Anemia had worsened and patient 

had worsening weakness with his anemia.  He is feeling better after 1 unit of 

packed red blood cells.  Will transfuse an additional 1 unit of packed red 

blood cells due to patient's cardiac condition and pulmonary condition.  He 

would benefit from having a hemoglobin closer to 10.0.





(2) Generalized weakness


ICD Codes:  R53.1 - Weakness


Status:  Acute


Plan:  Patient had profound weakness yesterday.  It is better after the 

transfusion.  Will ask PT to assess the patient's mobility.





(3) Fever


ICD Codes:  R50.9 - Fever, unspecified


Status:  Acute


Plan:  The patient had a temperature up to 101F yesterday.  His white blood 

cell count is normal and he has been afebrile since admission but he is 

receiving steroids.  I have consulted infectious disease to assist with further 

treatment.  Likely source is pulmonary.  The patient has history of positive 

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





(4) Bronchiectasis


ICD Codes:  J47.9 - Bronchiectasis, uncomplicated


Status:  Chronic


Plan:  The patient had been receiving nebulized tobramycin until a day prior to 

admission.  He was to begin oral Cipro on the day of admission.  He still is 

having persistent congestion and cough.  He had temperature to 101F on the day 

of admission.  I have consulted infectious disease and pulmonary to assist with 

formulating plan for treatment of the patient's pulmonary condition.





(5) Chronic atrial fibrillation


ICD Codes:  I48.2 - Chronic atrial fibrillation


Status:  Chronic


Plan:  Ventricular rate is well controlled.  Patient is receiving Eliquis for 

prophylaxis against embolism.





(6) Aortic stenosis


ICD Codes:  I35.0 - Nonrheumatic aortic (valve) stenosis


Status:  Chronic


Plan:  The patient has moderate to severe aortic stenosis.  I have consulted 

Dr. Marrero for assistance with further evaluation and treatment.  He has had 

recent increase in edema which resolved with the diuretics but he has developed 

hypotension which is contributed to patient's weakness.








Problem Qualifiers





(1) Anemia:  


Qualified Codes:  D64.9 - Anemia, unspecified


(2) Fever:  


Qualified Codes:  R50.9 - Fever, unspecified


(3) Bronchiectasis:  


Qualified Codes:  J47.0 - Bronchiectasis with acute lower respiratory infection


(4) Aortic stenosis:  


Qualified Codes:  I35.0 - Nonrheumatic aortic (valve) stenosis








Liam Esparza MD Jun 6, 2018 08:54

## 2018-06-06 NOTE — PD.ID.CON
History of Present Illness


Service


ID


Consult Requested By


Dr Esparza


Reason for Consult


M. abscessus in sputum clx


Primary Care Physician


Liam Esparza MD


Diagnoses:  


History of Present Illness


84 yo male with CAD, pacemenre, COPD 


reports months of chronic dry cough


Presented with 1 day of generalysed weakness and fever


He has  severe aortic stenosis


He apparently was experience a lot of sputum  production 1-2 mos ago, and the 

sputum was sent for clx, now  is again unable to expectorate


He  is taking  cipro and tobr nebs for months w/o any significant improvement


His sputum culture (1 ) grew M abscessus along with PSAE


He had CT w//o contrast today,  showed some nonspeciific infiltrates


I dw Dr RUBY Merrill (radiologist) the findings on CT, his radiological picture is 

not c/w mycobacterial pulmonary disease





Review of Systems


Respiratory:  COMPLAINS OF: Cough, Shortness of breath


Except as stated in HPI:  all other systems reviewed are Neg





Past Family Social History


Allergies:  


Coded Allergies:  


     No Known Allergies (Verified , 9/28/13)


Past Medical History


Chronic atrial fibrillation


History of asystole with St. Pradeep pacemaker in place


Moderate to severe aortic stenosis


COPD with bronchiectasis


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


Hyperlipidemia


Sleep apnea


Lumbar and cervical radiculitis


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


Pneumonia


Past Surgical History


 Pacemaker placement with generator change out 2009, 2013


Heart catheterization 2003


Bilateral cataract surgery with lens implants


Tonsillectomy


Right foot toe surgery


Active Ordered Medications


Medications where reviewed in EMR


Antibiotics Include:  none


Family History


reviewed


non contributory


Social History


remote Tobacco.


No ETOH.


No Illicit Drugs.





Physical Exam


Vital Signs





Vital Signs








  Date Time  Temp Pulse Resp B/P (MAP) Pulse Ox O2 Delivery O2 Flow Rate FiO2


 


6/6/18 16:11     97   21


 


6/6/18 16:05 97.6 77 18 117/62 97   


 


6/6/18 15:50 97.6 78 16 112/60 98   


 


6/6/18 12:00 97.7 75 16 101/59 (73) 98   


 


6/6/18 09:07     97   21


 


6/6/18 04:00 97.4 72 17 93/51 (65) 95   


 


6/6/18 00:01 97.7 76 17 96/56 (69) 96   


 


6/5/18 20:09     99   21


 


6/5/18 19:22  79 18 105/56 (72) 97 Room Air  


 


6/5/18 18:30 99.3 82 19 82/55 96   


 


6/5/18 18:14 98.9 84 20 102/48 96   








Physical Exam


 CONSTITUTIONAL/GENERAL: This is an adequately nourished patient, in no 

apparent distress.


TUBES/LINES/DRAINS:


SKIN: No jaundice, rashes, or lesions. Ecchymoses on upper extremities. No 

wounds seen anteriorly. Skin temperature appropriate. Not diaphoretic. 


HEAD: Atraumatic. Normocephalic.


EYES: Pupils equal and round and reactive. Extraocular motions intact. No 

scleral icterus. No injection or drainage. Fundi not examined.


ENT: Hearing grossly normal. Nose without bleeding or purulent drainage. Throat 

without visible erythema, exudates, masses, or lesions.


NECK: Trachea midline. Supple, nontender. No palpable thyroid enlargement or 

nodularity. 


CARDIOVASCULAR: Regular rate and rhythm without  gallops, or rubs.


Harsh 4/6 holosystolic  murmur on aorta


,No JVD. Peripheral pulses symmetric.


Pacer in place L chest  - looks OK


RESPIRATORY/CHEST: Symmetric, unlabored respirations. Diffuse b/l rhonchi to 

auscultation.   No wheezes .  


GASTROINTESTINAL: Abdomen soft, non-tender, nondistended. No hepato-splenomegaly

, or palpable masses. No guarding. Bowel sounds present.


GENITOURINARY: Without palpable bladder distension.  


MUSCULOSKELETAL: Extremities without clubbing, cyanosis, or edema. No joint 

tenderness or effusion noted. No calf tenderness. No mottling or clubbing.


LYMPHATICS: No palpable cervical or supraclavicular adenopathy.


NEUROLOGICAL: Awake and alert. Motor and sensory grossly within normal limits. 

Follows commands. Clear speech. Moves all extremities.


PSYCHIATRIC: No obvious anxiety/depression. no apparent hallucinations or other 

psychotic thought process.


Laboratory





Laboratory Tests








Test


  6/5/18


17:22 6/6/18


06:38


 


Urine Color YELLOW  


 


Urine Turbidity CLEAR  


 


Urine pH 5.5  


 


Urine Specific Gravity 1.013  


 


Urine Protein NEG  


 


Urine Glucose (UA) NEG  


 


Urine Ketones NEG  


 


Urine Occult Blood NEG  


 


Urine Nitrite NEG  


 


Urine Bilirubin NEG  


 


Urine Urobilinogen 2.0  


 


Urine Leukocyte Esterase TRACE  


 


Urine RBC 1  


 


Urine WBC 3  


 


Urine Mucus FEW  


 


Microscopic Urinalysis Comment


  CULT NOT


INDICATED 


 


 


White Blood Count  4.1 


 


Red Blood Count  2.45 


 


Hemoglobin  8.6 


 


Hematocrit  25.2 


 


Mean Corpuscular Volume  102.9 


 


Mean Corpuscular Hemoglobin  35.3 


 


Mean Corpuscular Hemoglobin


Concent 


  34.3 


 


 


Red Cell Distribution Width  19.6 


 


Platelet Count  143 


 


Mean Platelet Volume  9.7 


 


Neutrophils (%) (Auto)  88.2 


 


Lymphocytes (%) (Auto)  10.5 


 


Monocytes (%) (Auto)  1.2 


 


Eosinophils (%) (Auto)  0.0 


 


Basophils (%) (Auto)  0.1 


 


Neutrophils # (Auto)  3.7 


 


Lymphocytes # (Auto)  0.4 


 


Monocytes # (Auto)  0.1 


 


Eosinophils # (Auto)  0.0 


 


Basophils # (Auto)  0.0 


 


CBC Comment  DIFF FINAL 


 


Differential Comment   


 


Blood Urea Nitrogen  41 


 


Creatinine  1.61 


 


Random Glucose  206 


 


Total Protein  6.3 


 


Albumin  2.8 


 


Calcium Level  8.7 


 


Alkaline Phosphatase  63 


 


Aspartate Amino Transf


(AST/SGOT) 


  11 


 


 


Alanine Aminotransferase


(ALT/SGPT) 


  14 


 


 


Total Bilirubin  1.1 


 


Sodium Level  140 


 


Potassium Level  4.3 


 


Chloride Level  108 


 


Carbon Dioxide Level  20.9 


 


Anion Gap  11 


 


Estimat Glomerular Filtration


Rate 


  41 


 














 Date/Time


Source Procedure


Growth Status


 


 


 6/5/18 16:05


Blood Peripheral Aerobic Blood Culture - Preliminary


NO GROWTH IN 1 DAY Resulted


 


 6/5/18 16:05


Blood Peripheral Anaerobic Blood Culture - Preliminary


NO GROWTH IN 1 DAY Resulted








Result Diagram:  


6/6/18 0638                                                                    

            6/6/18 0638





Imaging





Last Impressions








Chest X-Ray 6/5/18 1353 Signed





Impressions: 





 CONCLUSION: 





 Lungs are grossly clear.





  





 


 


Chest CT 6/5/18 0000 Addendum





Impressions: 





 CONCLUSION:





 1.  Patchy bilateral pulmonary infiltrates and emphysema identified.





 2.  There is a noncalcified pulmonary nodule in the left upper lobe abutting th





 e oblique fissure measuring 9 mm.





  





 











Assessment and Plan


Assessment and Plan


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


   - not enough clinical and radiological data to confirm NTM disaes in the pt


PSAE PNA,  lenght  o/p treatment failure


    I to Levaquine, that could explain tx failure


Severe AS


- start cefepime for PSAE


- repeat CT in 1 month


repeat sputum cultures, including AFB


Discussed Condition With


pt











Andria Cortes MD Jun 6, 2018 16:59

## 2018-06-06 NOTE — EKG
Date Performed: 06/05/2018       Time Performed: 13:05:00

 

PTAGE:      83 years

 

EKG:      ELECTRONIC VENTRICULAR PACEMAKER ABNORMAL RHYTHM ECG Compared to 

 

 PREVIOUS TRACING            ,  paced rhythm now present

 

DOCTOR:   Nima Garcia  Interpretating Date/Time  06/06/2018 19:21:49

## 2018-06-06 NOTE — PD.CONS
HPI


Service


Cardiology-Dr. Marrero


Consult Requested By


Dr. Esparza


Reason for Consult


Chronic atrial fibrillation


Aortic stenosis


Primary Care Physician


Liam Esparza MD


History of Present Illness


Pleasant 83 year old male with a significant past cardiac history of chronic 

atrial fibrillation, asystole followed by pace maker placement, and moderate to 

severe aortic stenosis. Patient reports he has chronic chest congestion. He had 

been on tobramycin nebulized solution with some improvement in his congestion. 

He reports that he became very weak, could not get up out of his chair and 

developed shaking chills. When EMS arrived his temperature was 101 and he 

refused to go to ER. The following morning he continued to have weakness and 

could not get out of his chair, he contacted Yefri Esparza and was advised to go 

to ER. The patient denies any chest pain, lightheadedness, syncope, nausea or 

vomiting. He admits to chronic congestion. He denies dark stool or blood in his 

urine. 


 (XiomyApril Felicia MCGILL)





Review of Systems


Consitutional:  COMPLAINS OF: Fatigue, Fever, Chills


Eyes:  DENIES: Amaurosis Fugax, Change in vision


HEENT:  DENIES: Lightheadedness, Change in hearing


Respiratory:  COMPLAINS OF: Cough, Sputum production


Cardiovascular:  DENIES: See HPI, Chest pain, Palpitations, Syncope, Tachycardia


Gastrointestinal:  DENIES: Nausea, Vomiting, Change in bowel habits, Reflux, 

Bloody stools, Melena


Genitourinary:  DENIES: Urinary incontinence, Difficulty voiding


Integumentary:  DENIES: Rash


Neurologic:  DENIES: Tingling or numbness, Memory problems, Poor Balance, 

Stroke symptoms


Musculoskeletal:  DENIES: Joint pain, Muscle pain, Limited range of motion, 

Back pain


Psychiatric:  DENIES: Anxiety, Depression, Sleep disturbances


Hematologic:  DENIES: Bruising tendencies, Bleeding tendencies


Endocrine:  DENIES: Weight gain, Weight loss, Thyroid disease (XiomyApril 

Felicia MCGILL)





Past Family Social History


Allergies:  


Coded Allergies:  


     No Known Allergies (Verified , 9/28/13)


Past Medical History


Chronic atrial fibrillation


History of asystole with St. Pradeep pacemaker in place


Moderate to severe aortic stenosis


COPD with bronchiectasis


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


Hyperlipidemia


Sleep apnea


Lumbar and cervical radiculitis


[place] disease with chronically elevated total billirubin


Pneumonia


Past Surgical History


Pacemaker placement with generator change out 2009, 2013


Heart catheterization 2003


Bilateral cataract surgery with lens implants


Tonsillectomy


Right foot toe surgery


Reported Medications





Reported Meds & Active Scripts


Active


Reported


Guaifenesin ER (Guaifenesin) 600 Mg Tab.er.12h 600 Mg PO DAILY


Claritin (Loratadine) 10 Mg Tablet 10 Mg PO DAILY


Duoneb (Ipratropium-Albuterol Neb) 0.5-2.5 Mg/3 Ml Neb 3 Ml NEB Q6HR PRN


Proventil Hfa 6.7 GM Inh (Albuterol Sulfate) 90 Mcg/Act Aer 2 Puff INH Q6HR PRN


Vitamin D (Cholecalciferol) 2,000 Unit Tab 2,000 Units PO DAILY


Centrum Silver Adult 50+ (Multiple Vitamins W/ Minerals) 0.4 Mg-300 Mcg-250 Mcg 

Tab 1 Tab PO DAILY


Eliquis (Apixaban) 5 Mg Tab 5 Mg PO BID


Norco (Hydrocodone-Acetaminophen)  Mg Tab 0.5 Tab PO Q8HR PRN


Fortesta Topical (Testosterone) 10 Mg/0.5 Gm Gel 10 Mg TOPICAL DAILY


     10 mg/actuation


Butrans Patch 168 HR (Buprenorphine Patch 168 HR) 10 Mcg/Hr Patch 1 Patch T-

DERMAL Q7D


Voltaren (Diclofenac Sodium) 1 % Gel..gram. 1 Applic TOPICAL TID


     Apply to affected joints


Advair Diskus Inh (Fluticasone-Salmeterol Inh) 500-50 Mcg/Blist Aer 1 Puff INH 

BID


     Rinse mouth after use.


Crestor (Rosuvastatin Calcium) 5 Mg Tab 5 Mg PO DAILY


Potassium Chloride ER (Potassium Chloride) 20 Meq Tab 20 Meq PO DAILY


Demadex (Torsemide) 20 Mg Tab 20 Mg PO DAILY


Singulair (Montelukast Sodium) 10 Mg Tab 10 Mg PO HS


Incruse Ellipta Inh (Umeclidinium Bromide Inh) 0.0625 Mg/Act Inh 1 Puff INH 

DAILY


Active Ordered Medications





Current Medications








 Medications


  (Trade)  Dose


 Ordered  Sig/Mina


 Route  Start Time


 Stop Time Status Last Admin


 


  (NS Flush)  2 ml  UNSCH  PRN


 IV FLUSH  6/5/18 18:15


     


 


 


  (NS Flush)  2 ml  BID


 IV FLUSH  6/5/18 21:00


     


 


 


  (Tylenol)  650 mg  Q4H  PRN


 PO  6/5/18 18:15


     


 


 


  (Narcan Inj)  0.4 mg  UNSCH  PRN


 IV PUSH  6/5/18 18:15


     


 


 


  (Milk Of


 Magnesia Liq)  30 ml  Q12H  PRN


 PO  6/5/18 18:15


     


 


 


  (Senokot)  17.2 mg  Q12H  PRN


 PO  6/5/18 18:15


     


 


 


  (Dulcolax Supp)  10 mg  DAILY  PRN


 RECTAL  6/5/18 18:15


     


 


 


  (Eliquis)  5 mg  BID


 PO  6/5/18 21:00


    6/5/18 21:01


 


 


  (Mucinex Er)  600 mg  DAILY


 PO  6/6/18 09:00


     


 


 


  (KCl)  20 meq  DAILY


 PO  6/6/18 09:00


     


 


 


  (Demadex)  20 mg  DAILY


 PO  6/6/18 09:00


     


 


 


 Non-Formulary


 Medication  1 patch  Q7D


 T-DERMAL  6/5/18 18:30


   Future Hold  


 


 


  (Symbicort


 160-4.5 Mcg Inh)  1 puff  BID


 INH  6/5/18 21:00


    6/5/18 21:01


 


 


  (Lipitor)  10 mg  DAILY


 PO  6/6/18 09:00


     


 


 


 Non-Formulary


 Medication  1 puff  DAILY


 INH  6/6/18 09:00


   Future Hold  


 


 


  (Duoneb Neb)  1 ampule  Q6HR  NEB


 NEB  6/5/18 22:00


    6/6/18 09:05


 


 


  (SoluMEDROL INJ)  60 mg  Q12HR


 IV PUSH  6/5/18 21:00


    6/5/18 21:01


 








Family History


, lives with his wife


Social History


Former smoker


No alcohol use


 (Shadeed,April Marie [facility])





Physical Exam


Vital Signs





Vital Signs








  Date Time  Temp Pulse Resp B/P (MAP) Pulse Ox O2 Delivery O2 Flow Rate FiO2


 


6/6/18 09:07     97   21


 


6/6/18 04:00 97.4 72 17 93/51 (65) 95   


 


6/6/18 00:01 97.7 76 17 96/56 (69) 96   


 


6/5/18 20:09     99   21


 


6/5/18 19:22  79 18 105/56 (72) 97 Room Air  


 


6/5/18 18:30 99.3 82 19 82/55 96   


 


6/5/18 18:14 98.9 84 20 102/48 96   


 


6/5/18 15:18  78 17 92/54 (67) 97 Room Air  


 


6/5/18 13:23 98.1 70 18 102/55 (82) 99   








Physical Exam


GENERAL: Pleasant elderly male, in no acute distress


SKIN: Warm and dry.


HEAD: Atraumatic. Normocephalic. 


EYES: Pupils equal and round. No scleral icterus. No injection or drainage. 


ENT: No nasal bleeding or discharge.  Mucous membranes pink and moist.


NECK: Trachea midline. No JVD. 


CARDIOVASCULAR: Regular rate and rhythm, paced. 3/6 systolic murmur


RESPIRATORY: Scattered diffuse rhonchi


GASTROINTESTINAL: Abdomen soft, non-tender, nondistended. Hepatic and splenic 

margins not palpable. 


MUSCULOSKELETAL: Extremities without clubbing, cyanosis, or edema. No obvious 

deformities. 


NEUROLOGICAL: Awake and alert. No obvious cranial nerve deficits.  Motor 

grossly within normal limits. Five out of 5 muscle strength in the arms and 

legs.  Normal speech.


PSYCHIATRIC: Appropriate mood and affect; insight and judgment normal.


Laboratory





Laboratory Tests








Test


  6/5/18


13:52 6/5/18


15:38 6/5/18


15:39 6/5/18


17:22


 


White Blood Count 10.2    


 


Red Blood Count 2.21    


 


Hemoglobin 7.8    


 


Hematocrit 23.0    


 


Mean Corpuscular Volume 104.1    


 


Mean Corpuscular Hemoglobin 35.4    


 


Mean Corpuscular Hemoglobin


Concent 34.0 


  


  


  


 


 


Red Cell Distribution Width 18.2    


 


Platelet Count 158    


 


Mean Platelet Volume 9.7    


 


Neutrophils (%) (Auto) 75.8    


 


Lymphocytes (%) (Auto) 10.5    


 


Monocytes (%) (Auto) 13.4    


 


Eosinophils (%) (Auto) 0.1    


 


Basophils (%) (Auto) 0.2    


 


Neutrophils # (Auto) 7.7    


 


Lymphocytes # (Auto) 1.1    


 


Monocytes # (Auto) 1.4    


 


Eosinophils # (Auto) 0.0    


 


Basophils # (Auto) 0.0    


 


CBC Comment DIFF FINAL    


 


Differential Comment     


 


Prothrombin Time 12.0    


 


Prothromb Time International


Ratio 1.2 


  


  


  


 


 


Activated Partial


Thromboplast Time 34.5 


  


  


  


 


 


Blood Urea Nitrogen 41    


 


Creatinine 1.81    


 


Random Glucose 121    


 


Calcium Level 8.1    


 


Sodium Level 139    


 


Potassium Level 3.9    


 


Chloride Level 106    


 


Carbon Dioxide Level 21.5    


 


Anion Gap 12    


 


Estimat Glomerular Filtration


Rate 36 


  


  


  


 


 


Total Creatine Kinase 34    


 


Troponin I 0.04    


 


Lactic Acid Level  1.6   


 


Total Bilirubin   1.5  


 


Direct Bilirubin   0.5  


 


Indirect Bilirubin   1.0  


 


Aspartate Amino Transf


(AST/SGOT) 


  


  9 


  


 


 


Alanine Aminotransferase


(ALT/SGPT) 


  


  14 


  


 


 


Alkaline Phosphatase   62  


 


Total Protein   6.2  


 


Albumin   2.9  


 


Urine Color    YELLOW 


 


Urine Turbidity    CLEAR 


 


Urine pH    5.5 


 


Urine Specific Gravity    1.013 


 


Urine Protein    NEG 


 


Urine Glucose (UA)    NEG 


 


Urine Ketones    NEG 


 


Urine Occult Blood    NEG 


 


Urine Nitrite    NEG 


 


Urine Bilirubin    NEG 


 


Urine Urobilinogen    2.0 


 


Urine Leukocyte Esterase    TRACE 


 


Urine RBC    1 


 


Urine WBC    3 


 


Urine Mucus    FEW 


 


Microscopic Urinalysis Comment


  


  


  


  CULT NOT


INDICATED


 


Test


  6/6/18


06:38 


  


  


 


 


White Blood Count 4.1    


 


Red Blood Count 2.45    


 


Hemoglobin 8.6    


 


Hematocrit 25.2    


 


Mean Corpuscular Volume 102.9    


 


Mean Corpuscular Hemoglobin 35.3    


 


Mean Corpuscular Hemoglobin


Concent 34.3 


  


  


  


 


 


Red Cell Distribution Width 19.6    


 


Platelet Count 143    


 


Mean Platelet Volume 9.7    


 


Neutrophils (%) (Auto) 88.2    


 


Lymphocytes (%) (Auto) 10.5    


 


Monocytes (%) (Auto) 1.2    


 


Eosinophils (%) (Auto) 0.0    


 


Basophils (%) (Auto) 0.1    


 


Neutrophils # (Auto) 3.7    


 


Lymphocytes # (Auto) 0.4    


 


Monocytes # (Auto) 0.1    


 


Eosinophils # (Auto) 0.0    


 


Basophils # (Auto) 0.0    


 


CBC Comment DIFF FINAL    


 


Differential Comment     


 


Blood Urea Nitrogen 41    


 


Creatinine 1.61    


 


Random Glucose 206    


 


Total Protein 6.3    


 


Albumin 2.8    


 


Calcium Level 8.7    


 


Alkaline Phosphatase 63    


 


Aspartate Amino Transf


(AST/SGOT) 11 


  


  


  


 


 


Alanine Aminotransferase


(ALT/SGPT) 14 


  


  


  


 


 


Total Bilirubin 1.1    


 


Sodium Level 140    


 


Potassium Level 4.3    


 


Chloride Level 108    


 


Carbon Dioxide Level 20.9    


 


Anion Gap 11    


 


Estimat Glomerular Filtration


Rate 41 


  


  


  


 














 Date/Time


Source Procedure


Growth Status


 


 


 6/5/18 16:05


Blood Peripheral Aerobic Blood Culture


Pending Received


 


 6/5/18 16:05


Blood Peripheral Anaerobic Blood Culture


Pending Received








 (Saritha Stauffer)


Result Diagram:  


6/6/18 0638                                                                    

            6/6/18 0638





Imaging





Last 72 hours Impressions








Chest X-Ray 6/5/18 1353 Signed





Impressions: 





 CONCLUSION: 





 Lungs are grossly clear.





  





 


 


Chest CT 6/5/18 0000 Signed





Impressions: 





 CONCLUSION:





 1.  Patchy bilateral pulmonary infiltrates and emphysema identified.





 2.  There is a noncalcified pulmonary nodule in the left upper lobe abutting th





 e oblique fissure measuring 9 mm.





  





 








 (Saritha Stauffer)





Assessment and Plan


Assessment and Plan


Chronic atrial fibrillation


Moderate to severe aortic stenosis


Hypotension


Profound weakness


Anemia


Lung infection





HR is controlled. Patient continues on eliquis, dosed appropriately. Denies any 

issues with bleeding. However has macrocytic anemia. 


Last echo was completed 03/2018 showing moderate to severe aortic stenosis. 

Discussed considering stented valve down the road. Will defer for now due to 

lung infection


Denies any dizziness or lightheadedness. 


Profound weakness probably secondary to lung infection and anemia, pt recieved 

1 unit of packed RBCs. Reports feeling much stronger this AM


Nontuberculous mycobacterium infection- Infectious disease and pulmonary Dr. Deng have been consulted. 





The patient was seen and evaluated by Dr. Marrero who completed face to face 

encounter and physical exam and participated in care, management and decision 

making. 


 (Saritha Stauffer)


Assessment and Plan


The exam, history, and the medical decision-making described in the above note 

were completed with the assistance of the mid-level provider. I reviewed and 

agree with the findings presented.  I attest that I had a face-to-face 

encounter with the patient on the same day, and personally performed and 

documented my assessment and findings in the medical record. Main problem 

appears to be pulmonary , no symptoms of severe AS, will hold off consideration 

on AVR etc for now.


 (Ethan Marrero MD)











Saritha Stauffer Jun 6, 2018 10:13


Ethan Marrero MD Jun 6, 2018 14:41

## 2018-06-06 NOTE — MP
cc:

Ton Camilo MD

****

 

 

DATE OF OPERATION:

06/06/2018

 

REASON FOR CONSULTATION:

COPD exacerbation and pneumonia.

 

HISTORY OF PRESENT ILLNESS:

Mr. Velazco is an 83-year-old  male with known history of COPD,

atypical mycobacterial lung infection, chronic atrial fibrillation and

aortic stenosis who was admitted with severe weakness and temperature 

elevation to 101 degrees Fahrenheit, cough, expectoration of whitish 

mucoid sputum, no history of hemoptysis.  No TB, no history of 

industrial exposure.

 

PAST MEDICAL HISTORY:

Essentially as above.  Notable for COPD and bronchiectasis, atypical 

mycobacterial infection.  Previously reports indicate Mycobacterium 

abscessus as well as presence of Pseudomonas and beta Strep in the 

sputum, and a history of obstructive sleep apnea on CPAP therapy, 

history of Gilbert's disease and abnormal liver function associated 

with it, degenerative joint disease, chronic thrombocytopenia, 

pulmonary hypertension.

 

MEDICATIONS AT HOME:

1.  Incruse Ellipta.

2.  Advair twice daily.

3.  Hydrocodone/acetaminophen.

4.  Butrans patch.

5.  Voltaren gel.

6.  Rosuvastatin.

7.  Torsemide.

8.  Mucinex.

9.  Eliquis.

10.  Singulair.

11.  Loratadine.

12.  Nebulized albuterol and ipratropium.

13.  Vitamin tablet.

 

ALLERGIES:

NONE KNOWN TO MEDICATION.

 

FAMILY HISTORY:

Noncontributory.

 

SOCIAL HISTORY:

Retired professor.  Long heavy smoking history; however, stopped 10 

years ago.  Does not drink alcohol, does not use drugs.

 

SYSTEMS REVIEW:

A 12-point review of systems as per HPI and Past History, otherwise 

negative.

 

PHYSICAL EXAMINATION:

GENERAL:  On exam, the patient is alert.

VITAL SIGNS:  Temperature 98 degrees Fahrenheit, pulse 80, 

respirations 16, blood pressure 112/60.

HEENT:  Unremarkable.  Eyes without icterus.

NECK:  Without adenopathy or thyroid enlargement.  Central trachea.

CHEST:  Scattered rhonchi, wheeze bilaterally.

CARDIAC:  PMI not appreciated.  Irregularity noted.

ABDOMEN:  Lax.  Bowel sounds audible.

EXTREMITIES:  No clubbing, cyanosis or edema.

SKIN:  Normal.

LYMPHATICS:  No lymphadenopathy.

 

LABORATORY DATA:

White count 4000, hemoglobin 8.6, hematocrit 25.

Sodium 140, potassium 4.3, BUN 41, creatinine 1.6.

INR 1.2.

 

IMAGING STUDIES:

CT scan of the chest 06/05/2018 with patchy bilateral lung 

infiltrates, chronicity indeterminate and a noncalcified lung nodule 

left upper lobe.

 

IMPRESSION.

1.  Chronic obstructive pulmonary disease exacerbation.

2.  Pneumonia.

3.  Obstructive sleep apnea.

4.  Chronic atrial fibrillation.

5.  Bronchiectasis.

6.  Anemia.

7.  Renal insufficiency.

 

PLAN:

The patient's symptomatology as present is acute with temperature 

elevation likely related to an acute bacterial or viral infection.  

Antibiotic therapy on an empiric basis together with bronchodilator 

therapy and steroid therapy would be appropriate until acute 

symptomatology abates.  The finding on chest x-ray may be related to 

previous atypical mycobacterial infection as outlined above as well as

underlying bronchiectasis.  Nebulized bronchodilator therapy will be 

continued.  Follow the patient's course closely and depending on 

progress, proceed further.

 

I do thank you for asking me to partake in Mr. Curtis Velazco's care.

 

 

__________________________________

MD PIPER Nuñez/TRISTEN

D: 06/06/2018, 04:05 PM

T: 06/06/2018, 04:28 PM

Visit #: [id number]

Job #: [id number]

## 2018-06-07 VITALS
HEART RATE: 78 BPM | SYSTOLIC BLOOD PRESSURE: 133 MMHG | DIASTOLIC BLOOD PRESSURE: 76 MMHG | OXYGEN SATURATION: 96 % | RESPIRATION RATE: 18 BRPM | TEMPERATURE: 97.8 F

## 2018-06-07 VITALS
RESPIRATION RATE: 18 BRPM | HEART RATE: 77 BPM | SYSTOLIC BLOOD PRESSURE: 125 MMHG | DIASTOLIC BLOOD PRESSURE: 65 MMHG | OXYGEN SATURATION: 96 % | TEMPERATURE: 98 F

## 2018-06-07 VITALS
DIASTOLIC BLOOD PRESSURE: 68 MMHG | HEART RATE: 51 BPM | RESPIRATION RATE: 16 BRPM | TEMPERATURE: 98.1 F | SYSTOLIC BLOOD PRESSURE: 116 MMHG | OXYGEN SATURATION: 97 %

## 2018-06-07 VITALS
OXYGEN SATURATION: 97 % | RESPIRATION RATE: 18 BRPM | HEART RATE: 70 BPM | DIASTOLIC BLOOD PRESSURE: 62 MMHG | TEMPERATURE: 97.8 F | SYSTOLIC BLOOD PRESSURE: 122 MMHG

## 2018-06-07 VITALS
HEART RATE: 79 BPM | SYSTOLIC BLOOD PRESSURE: 117 MMHG | DIASTOLIC BLOOD PRESSURE: 60 MMHG | RESPIRATION RATE: 18 BRPM | TEMPERATURE: 97.5 F | OXYGEN SATURATION: 97 %

## 2018-06-07 VITALS
OXYGEN SATURATION: 95 % | TEMPERATURE: 97.1 F | DIASTOLIC BLOOD PRESSURE: 59 MMHG | RESPIRATION RATE: 20 BRPM | HEART RATE: 57 BPM | SYSTOLIC BLOOD PRESSURE: 113 MMHG

## 2018-06-07 VITALS — OXYGEN SATURATION: 97 %

## 2018-06-07 LAB
BASOPHILS # BLD AUTO: 0 TH/MM3 (ref 0–0.2)
BASOPHILS NFR BLD: 0.1 % (ref 0–2)
EOSINOPHIL # BLD: 0 TH/MM3 (ref 0–0.4)
EOSINOPHIL NFR BLD: 0 % (ref 0–4)
ERYTHROCYTE [DISTWIDTH] IN BLOOD BY AUTOMATED COUNT: 20.5 % (ref 11.6–17.2)
HCT VFR BLD CALC: 26.7 % (ref 39–51)
HGB BLD-MCNC: 9.2 GM/DL (ref 13–17)
LYMPHOCYTES # BLD AUTO: 0.4 TH/MM3 (ref 1–4.8)
LYMPHOCYTES NFR BLD AUTO: 7.6 % (ref 9–44)
MCH RBC QN AUTO: 34.4 PG (ref 27–34)
MCHC RBC AUTO-ENTMCNC: 34.4 % (ref 32–36)
MCV RBC AUTO: 100.1 FL (ref 80–100)
MONOCYTE #: 0.2 TH/MM3 (ref 0–0.9)
MONOCYTES NFR BLD: 2.6 % (ref 0–8)
NEUTROPHILS # BLD AUTO: 5.3 TH/MM3 (ref 1.8–7.7)
NEUTROPHILS NFR BLD AUTO: 89.7 % (ref 16–70)
PLATELET # BLD: 146 TH/MM3 (ref 150–450)
PMV BLD AUTO: 10 FL (ref 7–11)
RBC # BLD AUTO: 2.67 MIL/MM3 (ref 4.5–5.9)
WBC # BLD AUTO: 5.9 TH/MM3 (ref 4–11)

## 2018-06-07 RX ADMIN — TORSEMIDE SCH MG: 20 TABLET ORAL at 09:52

## 2018-06-07 RX ADMIN — IPRATROPIUM BROMIDE AND ALBUTEROL SULFATE SCH AMPULE: .5; 3 SOLUTION RESPIRATORY (INHALATION) at 22:14

## 2018-06-07 RX ADMIN — GUAIFENESIN SCH MG: 600 TABLET, EXTENDED RELEASE ORAL at 09:53

## 2018-06-07 RX ADMIN — CEFEPIME SCH MLS/HR: 2 INJECTION, POWDER, FOR SOLUTION INTRAVENOUS at 10:53

## 2018-06-07 RX ADMIN — BUDESONIDE AND FORMOTEROL FUMARATE DIHYDRATE SCH PUFF: 160; 4.5 AEROSOL RESPIRATORY (INHALATION) at 09:50

## 2018-06-07 RX ADMIN — IPRATROPIUM BROMIDE AND ALBUTEROL SULFATE SCH AMPULE: .5; 3 SOLUTION RESPIRATORY (INHALATION) at 16:14

## 2018-06-07 RX ADMIN — APIXABAN SCH MG: 5 TABLET, FILM COATED ORAL at 21:41

## 2018-06-07 RX ADMIN — ATORVASTATIN CALCIUM SCH MG: 10 TABLET, FILM COATED ORAL at 09:53

## 2018-06-07 RX ADMIN — CEFEPIME SCH MLS/HR: 2 INJECTION, POWDER, FOR SOLUTION INTRAVENOUS at 21:42

## 2018-06-07 RX ADMIN — IPRATROPIUM BROMIDE AND ALBUTEROL SULFATE SCH AMPULE: .5; 3 SOLUTION RESPIRATORY (INHALATION) at 03:56

## 2018-06-07 RX ADMIN — Medication SCH ML: at 09:51

## 2018-06-07 RX ADMIN — IPRATROPIUM BROMIDE AND ALBUTEROL SULFATE SCH AMPULE: .5; 3 SOLUTION RESPIRATORY (INHALATION) at 09:06

## 2018-06-07 RX ADMIN — BUDESONIDE AND FORMOTEROL FUMARATE DIHYDRATE SCH PUFF: 160; 4.5 AEROSOL RESPIRATORY (INHALATION) at 21:43

## 2018-06-07 RX ADMIN — APIXABAN SCH MG: 5 TABLET, FILM COATED ORAL at 11:00

## 2018-06-07 RX ADMIN — POTASSIUM CHLORIDE SCH MEQ: 20 TABLET, EXTENDED RELEASE ORAL at 09:52

## 2018-06-07 RX ADMIN — Medication SCH ML: at 21:42

## 2018-06-07 NOTE — HHI.PR
Subjective


Remarks


ALERT'


MAD





Objective





Vital Signs








  Date Time  Temp Pulse Resp B/P (MAP) Pulse Ox O2 Delivery O2 Flow Rate FiO2


 


6/7/18 11:26 98.1 51 16 116/68 (84) 97   


 


6/7/18 08:00 97.8 70 18 122/62 (82) 97   


 


6/7/18 04:00 97.5 79 18 117/60 (79) 97   


 


6/7/18 03:59     97   


 


6/7/18 00:01 98.0 77 18 125/65 (85) 96   


 


6/6/18 20:00 98.3 87 18 119/56 (77) 96   


 


6/6/18 19:40 98.3 87 18 119/56 96   


 


6/6/18 16:11     97   21


 


6/6/18 16:05 97.6 77 18 117/62 97   


 


6/6/18 16:00 98.4 63 16 116/56 (76) 94   


 


6/6/18 15:50 97.6 78 16 112/60 98   














I/O      


 


 6/6/18 6/6/18 6/6/18 6/7/18 6/7/18 6/7/18





 07:00 15:00 23:00 07:00 15:00 23:00


 


Intake Total 220 ml 600 ml 512 ml   


 


Output Total 450 ml     


 


Balance -230 ml 600 ml 512 ml   


 


      


 


Intake Oral 220 ml 600 ml    


 


IV Total   100 ml   


 


Packed Cells   400 ml   


 


Blood Product IV Normal Saline Flush   12 ml   


 


Output Urine Total 450 ml     


 


# Voids  1  3  








Result Diagram:  


6/7/18 0521                                                                    

            6/6/18 0638





Objective Remarks


GENERAL: 


SKIN: Warm and dry.


HEAD: Atraumatic. Normocephalic. 


EYES: Pupils equal and round. No scleral icterus. No injection or drainage. 


ENT: No nasal bleeding or discharge.  Mucous membranes pink and moist.


NECK: Trachea midline. No JVD. 


CARDIOVASCULAR: Regular rate and rhythm.  


RESPIRATORY: No accessory muscle use. SCATTERED RHONCHI AT [employer] to 

auscultation. Breath sounds equal bilaterally. 


GASTROINTESTINAL: Abdomen soft, non-tender, nondistended. Hepatic and splenic 

margins not palpable. 


MUSCULOSKELETAL: Extremities without clubbing, cyanosis, or edema. No obvious 

deformities. 


NEUROLOGICAL: Awake and alert. No obvious cranial nerve deficits.  Motor 

grossly within normal limits. Five out of 5 muscle strength in the arms and 

legs.  Normal speech.


PSYCHIATRIC: Appropriate mood and affect; insight and judgment normal.





Assessment and Plan


Assessment and Plan


IMP





PNA


H/O ATYPICAL TB


JJ





PLAN





O2 AS NEEDED


ANTIBX PER ID


CULTURE SPUTUM











Ton Camilo MD Jun 7, 2018 15:33

## 2018-06-07 NOTE — HHI.PR
Subjective


Remarks


The patient reports he is feeling stronger.  He is ambulating to the bathroom 

with a walker.  Denies any chest pain or shortness of breath.  No 

lightheadedness or dizziness.  Appetite good.  Urine output good.





Status post second unit packed red blood cells.  Hemoglobin is 9.2 today.








Current Medications








 Medications


  (Trade)  Dose


 Ordered  Sig/Mina


 Route  Start Time


 Stop Time Status Last Admin


 


  (NS Flush)  2 ml  UNSCH  PRN


 IV FLUSH  6/5/18 18:15


     


 


 


  (NS Flush)  2 ml  BID


 IV FLUSH  6/5/18 21:00


    6/6/18 20:51


 


 


  (Tylenol)  650 mg  Q4H  PRN


 PO  6/5/18 18:15


     


 


 


  (Narcan Inj)  0.4 mg  UNSCH  PRN


 IV PUSH  6/5/18 18:15


     


 


 


  (Milk Of


 Magnesia Liq)  30 ml  Q12H  PRN


 PO  6/5/18 18:15


     


 


 


  (Senokot)  17.2 mg  Q12H  PRN


 PO  6/5/18 18:15


     


 


 


  (Dulcolax Supp)  10 mg  DAILY  PRN


 RECTAL  6/5/18 18:15


     


 


 


  (Eliquis)  5 mg  BID


 PO  6/5/18 21:00


    6/6/18 20:51


 


 


  (Mucinex Er)  600 mg  DAILY


 PO  6/6/18 09:00


    6/6/18 10:55


 


 


  (KCl)  20 meq  DAILY


 PO  6/6/18 09:00


    6/6/18 10:54


 


 


  (Demadex)  20 mg  DAILY


 PO  6/6/18 09:00


    6/6/18 11:01


 


 


 Non-Formulary


 Medication  1 patch  Q7D


 T-DERMAL  6/5/18 18:30


   Future Hold  


 


 


  (Symbicort


 160-4.5 Mcg Inh)  1 puff  BID


 INH  6/5/18 21:00


    6/6/18 20:52


 


 


  (Lipitor)  10 mg  DAILY


 PO  6/6/18 09:00


    6/6/18 10:54


 


 


 Non-Formulary


 Medication  1 puff  DAILY


 INH  6/6/18 09:00


   Future Hold  


 


 


  (Duoneb Neb)  1 ampule  Q6HR  NEB


 NEB  6/5/18 22:00


    6/7/18 03:56


 


 


  (SoluMEDROL INJ)  60 mg  Q12HR


 IV PUSH  6/5/18 21:00


    6/6/18 20:51


 


 


 Cefepime HCl 2000


 mg/Sodium Chloride  100 ml @ 


 200 mls/hr  Q12H


 IV  6/7/18 09:00


     


 











Objective





Vital Signs








  Date Time  Temp Pulse Resp B/P (MAP) Pulse Ox O2 Delivery O2 Flow Rate FiO2


 


6/7/18 08:00 97.8 70 18 122/62 (82) 97   


 


6/7/18 04:00 97.5 79 18 117/60 (79) 97   


 


6/7/18 03:59     97   


 


6/7/18 00:01 98.0 77 18 125/65 (85) 96   


 


6/6/18 20:00 98.3 87 18 119/56 (77) 96   


 


6/6/18 19:40 98.3 87 18 119/56 96   


 


6/6/18 16:11     97   21


 


6/6/18 16:05 97.6 77 18 117/62 97   


 


6/6/18 16:00 98.4 63 16 116/56 (76) 94   


 


6/6/18 15:50 97.6 78 16 112/60 98   


 


6/6/18 12:00 97.7 75 16 101/59 (73) 98   


 


6/6/18 09:07     97   21














I/O      


 


 6/6/18 6/6/18 6/6/18 6/7/18 6/7/18 6/7/18





 07:00 15:00 23:00 07:00 15:00 23:00


 


Intake Total 220 ml 600 ml 512 ml   


 


Output Total 450 ml     


 


Balance -230 ml 600 ml 512 ml   


 


      


 


Intake Oral 220 ml 600 ml    


 


IV Total   100 ml   


 


Packed Cells   400 ml   


 


Blood Product IV Normal Saline Flush   12 ml   


 


Output Urine Total 450 ml     


 


# Voids  1  3  








Cardiovascular: IRRR with 3/6 systolic murmur lungs:


Restricted but improved air exchange.  Expiratory wheezes.  Minimal rhonchi.  

No rales


Abdomen: Soft, nontender nondistended with bowel sounds present.


Extremities: No edema.  No calf tenderness.


Result Diagram:  


6/7/18 0521                                                                    

            6/6/18 0638








Assessment and Plan


Problem List:  


(1) Anemia


ICD Codes:  D64.9 - Anemia, unspecified


Status:  Chronic


Plan:  Status post transfusion of 2 units of packed red blood cells.  

Hemoglobin is 9.2 today.  Patient feeling stronger.  We will repeat H&H in the 

morning.  Anticipate some decrease in H&H due to redistribution of red blood 

cells.





(2) Generalized weakness


ICD Codes:  R53.1 - Weakness


Status:  Resolved


Plan:  Improved.  Patient ambulating with walker.  Will continue with physical 

therapy.  Patient has been receiving home health physical therapy at home.





(3) Fever


ICD Codes:  R50.9 - Fever, unspecified


Status:  Acute


Plan:  The patient has remained afebrile.  Repeat sputum culture pending.  Day 

2 of cefepime.





(4) Bronchiectasis


ICD Codes:  J47.9 - Bronchiectasis, uncomplicated


Status:  Chronic


Plan:  Appreciate infectious disease consultation.  Patient receiving cefepime (

day 2).  Respiratory status has improved.  Will change to p.o. steroids.  

Continue with nebulizer treatments.  Patient states that he would like to be 

discharged home as soon as possible as his wife is at home he needs his 

assistance.  Will discuss with infectious disease about anticipated length of 

IV antibiotics.  If necessary, can arrange for patient to have outpatient IV 

therapy.





(5) Chronic atrial fibrillation


ICD Codes:  I48.2 - Chronic atrial fibrillation


Status:  Chronic


Plan:  Ventricular rate is well controlled.  Patient is receiving Eliquis for 

prophylaxis against embolism.





(6) Aortic stenosis


ICD Codes:  I35.0 - Nonrheumatic aortic (valve) stenosis


Status:  Chronic


Plan:  The patient has moderate to severe aortic stenosis.  Currently stable.  

Any intervention for aortic valve needs to be done after patient's pulmonary 

status has improved.








Problem Qualifiers





(1) Anemia:  


Qualified Codes:  D64.9 - Anemia, unspecified


(2) Fever:  


Qualified Codes:  R50.9 - Fever, unspecified


(3) Bronchiectasis:  


Qualified Codes:  J47.0 - Bronchiectasis with acute lower respiratory infection


(4) Aortic stenosis:  


Qualified Codes:  I35.0 - Nonrheumatic aortic (valve) stenosis








Liam Esparza MD Jun 7, 2018 08:41

## 2018-06-07 NOTE — PD.CARD.PN
Subjective


Subjective Remarks


The patient denies SOB, CP, lightheadedness, edema or palpitations.





Objective


Medications





Current Medications








 Medications


  (Trade)  Dose


 Ordered  Sig/Mina


 Route  Start Time


 Stop Time Status Last Admin


 


  (NS Flush)  2 ml  UNSCH  PRN


 IV FLUSH  6/5/18 18:15


     


 


 


  (NS Flush)  2 ml  BID


 IV FLUSH  6/5/18 21:00


    6/6/18 20:51


 


 


  (Tylenol)  650 mg  Q4H  PRN


 PO  6/5/18 18:15


     


 


 


  (Narcan Inj)  0.4 mg  UNSCH  PRN


 IV PUSH  6/5/18 18:15


     


 


 


  (Milk Of


 Magnesia Liq)  30 ml  Q12H  PRN


 PO  6/5/18 18:15


     


 


 


  (Senokot)  17.2 mg  Q12H  PRN


 PO  6/5/18 18:15


     


 


 


  (Dulcolax Supp)  10 mg  DAILY  PRN


 RECTAL  6/5/18 18:15


     


 


 


  (Eliquis)  5 mg  BID


 PO  6/5/18 21:00


    6/6/18 20:51


 


 


  (Mucinex Er)  600 mg  DAILY


 PO  6/6/18 09:00


    6/6/18 10:55


 


 


  (KCl)  20 meq  DAILY


 PO  6/6/18 09:00


    6/6/18 10:54


 


 


  (Demadex)  20 mg  DAILY


 PO  6/6/18 09:00


    6/6/18 11:01


 


 


 Non-Formulary


 Medication  1 patch  Q7D


 T-DERMAL  6/5/18 18:30


   Future Hold  


 


 


  (Symbicort


 160-4.5 Mcg Inh)  1 puff  BID


 INH  6/5/18 21:00


    6/6/18 20:52


 


 


  (Lipitor)  10 mg  DAILY


 PO  6/6/18 09:00


    6/6/18 10:54


 


 


 Non-Formulary


 Medication  1 puff  DAILY


 INH  6/6/18 09:00


   Future Hold  


 


 


  (Duoneb Neb)  1 ampule  Q6HR  NEB


 NEB  6/5/18 22:00


    6/7/18 03:56


 


 


 Cefepime HCl 2000


 mg/Sodium Chloride  100 ml @ 


 200 mls/hr  Q12H


 IV  6/7/18 09:00


     


 


 


  (Deltasone)  20 mg  BID


 PO  6/7/18 09:00


     


 








Vital Signs / I&O





Vital Signs








  Date Time  Temp Pulse Resp B/P (MAP) Pulse Ox O2 Delivery O2 Flow Rate FiO2


 


6/7/18 08:00 97.8 70 18 122/62 (82) 97   


 


6/7/18 04:00 97.5 79 18 117/60 (79) 97   


 


6/7/18 03:59     97   


 


6/7/18 00:01 98.0 77 18 125/65 (85) 96   


 


6/6/18 20:00 98.3 87 18 119/56 (77) 96   


 


6/6/18 19:40 98.3 87 18 119/56 96   


 


6/6/18 16:11     97   21


 


6/6/18 16:05 97.6 77 18 117/62 97   


 


6/6/18 16:00 98.4 63 16 116/56 (76) 94   


 


6/6/18 15:50 97.6 78 16 112/60 98   


 


6/6/18 12:00 97.7 75 16 101/59 (73) 98   


 


6/6/18 09:07     97   21














I/O      


 


 6/6/18 6/6/18 6/6/18 6/7/18 6/7/18 6/7/18





 07:00 15:00 23:00 07:00 15:00 23:00


 


Intake Total 220 ml 600 ml 512 ml   


 


Output Total 450 ml     


 


Balance -230 ml 600 ml 512 ml   


 


      


 


Intake Oral 220 ml 600 ml    


 


IV Total   100 ml   


 


Packed Cells   400 ml   


 


Blood Product IV Normal Saline Flush   12 ml   


 


Output Urine Total 450 ml     


 


# Voids  1  3  








Physical Exam


GENERAL: Elderly male walking around room


SKIN: Warm and dry.


HEAD: Normocephalic.


EYES: No scleral icterus. No injection or drainage. 


NECK: Supple, trachea midline. No JVD or lymphadenopathy.


CARDIOVASCULAR: Irreg irreg, systolic murmur, ICD or PPM left chest 


RESPIRATORY: Coarse rhonchi


GASTROINTESTINAL: Abdomen soft, non-tender, nondistended. 


MUSCULOSKELETAL: No cyanosis, or edema. 


BACK: Nontender without obvious deformity. No CVA tenderness.





Laboratory





Laboratory Tests








Test


  6/7/18


05:21


 


White Blood Count 5.9 TH/MM3 


 


Red Blood Count 2.67 MIL/MM3 


 


Hemoglobin 9.2 GM/DL 


 


Hematocrit 26.7 % 


 


Mean Corpuscular Volume 100.1 FL 


 


Mean Corpuscular Hemoglobin 34.4 PG 


 


Mean Corpuscular Hemoglobin


Concent 34.4 % 


 


 


Red Cell Distribution Width 20.5 % 


 


Platelet Count 146 TH/MM3 


 


Mean Platelet Volume 10.0 FL 


 


Neutrophils (%) (Auto) 89.7 % 


 


Lymphocytes (%) (Auto) 7.6 % 


 


Monocytes (%) (Auto) 2.6 % 


 


Eosinophils (%) (Auto) 0.0 % 


 


Basophils (%) (Auto) 0.1 % 


 


Neutrophils # (Auto) 5.3 TH/MM3 


 


Lymphocytes # (Auto) 0.4 TH/MM3 


 


Monocytes # (Auto) 0.2 TH/MM3 


 


Eosinophils # (Auto) 0.0 TH/MM3 


 


Basophils # (Auto) 0.0 TH/MM3 


 


CBC Comment DIFF FINAL 


 


Differential Comment  











Assessment and Plan


Assessment and Plan


Chronic atrial fibrillation on Eliquis 


Moderate to severe aortic stenosis


Macrocytic anemia, MDS, s/p transfusion PRBC


Bronchiectasis


Chronic pseudomonas 


Mycobacterium abscessus 


Weigh loss due to above





Plan:


Continue Eliquis, decrease dose to Eliquis 2.5 mg BID on the basis of age > 80 

and Cr > 1.5 


Continue rate control therapy


Continue diuresis


The patient is asymptomatic of AS. The patient is clear for discharge from 

cardiovascular standpoint


The patient was seen and evaluated by Dr Marrero who completed face to face 

encounter and physical exam and participated in evaluation and management.











Aide Stephen Jun 7, 2018 09:11

## 2018-06-08 VITALS
SYSTOLIC BLOOD PRESSURE: 115 MMHG | TEMPERATURE: 97.7 F | HEART RATE: 73 BPM | RESPIRATION RATE: 18 BRPM | OXYGEN SATURATION: 95 % | DIASTOLIC BLOOD PRESSURE: 57 MMHG

## 2018-06-08 VITALS
DIASTOLIC BLOOD PRESSURE: 72 MMHG | SYSTOLIC BLOOD PRESSURE: 132 MMHG | OXYGEN SATURATION: 97 % | RESPIRATION RATE: 17 BRPM | HEART RATE: 72 BPM | TEMPERATURE: 97.4 F

## 2018-06-08 VITALS — OXYGEN SATURATION: 98 %

## 2018-06-08 VITALS
RESPIRATION RATE: 18 BRPM | DIASTOLIC BLOOD PRESSURE: 62 MMHG | TEMPERATURE: 97.6 F | HEART RATE: 70 BPM | SYSTOLIC BLOOD PRESSURE: 121 MMHG | OXYGEN SATURATION: 95 %

## 2018-06-08 VITALS
RESPIRATION RATE: 17 BRPM | SYSTOLIC BLOOD PRESSURE: 114 MMHG | DIASTOLIC BLOOD PRESSURE: 57 MMHG | OXYGEN SATURATION: 97 % | HEART RATE: 67 BPM | TEMPERATURE: 98.2 F

## 2018-06-08 VITALS
RESPIRATION RATE: 16 BRPM | SYSTOLIC BLOOD PRESSURE: 113 MMHG | DIASTOLIC BLOOD PRESSURE: 66 MMHG | OXYGEN SATURATION: 97 % | HEART RATE: 74 BPM | TEMPERATURE: 97.8 F

## 2018-06-08 LAB
BASOPHILS # BLD AUTO: 0 TH/MM3 (ref 0–0.2)
BASOPHILS NFR BLD: 0.1 % (ref 0–2)
BUN SERPL-MCNC: 46 MG/DL (ref 7–18)
CALCIUM SERPL-MCNC: 8.6 MG/DL (ref 8.5–10.1)
CHLORIDE SERPL-SCNC: 107 MEQ/L (ref 98–107)
CREAT SERPL-MCNC: 1.56 MG/DL (ref 0.6–1.3)
EOSINOPHIL # BLD: 0 TH/MM3 (ref 0–0.4)
EOSINOPHIL NFR BLD: 0 % (ref 0–4)
ERYTHROCYTE [DISTWIDTH] IN BLOOD BY AUTOMATED COUNT: 20.7 % (ref 11.6–17.2)
GFR SERPLBLD BASED ON 1.73 SQ M-ARVRAT: 43 ML/MIN (ref 89–?)
GLUCOSE SERPL-MCNC: 154 MG/DL (ref 74–106)
HCO3 BLD-SCNC: 22.6 MEQ/L (ref 21–32)
HCT VFR BLD CALC: 29.1 % (ref 39–51)
HGB BLD-MCNC: 9.9 GM/DL (ref 13–17)
LYMPHOCYTES # BLD AUTO: 0.7 TH/MM3 (ref 1–4.8)
LYMPHOCYTES NFR BLD AUTO: 8.6 % (ref 9–44)
MCH RBC QN AUTO: 34 PG (ref 27–34)
MCHC RBC AUTO-ENTMCNC: 34 % (ref 32–36)
MCV RBC AUTO: 99.8 FL (ref 80–100)
MONOCYTE #: 0.3 TH/MM3 (ref 0–0.9)
MONOCYTES NFR BLD: 3.6 % (ref 0–8)
NEUTROPHILS # BLD AUTO: 7.1 TH/MM3 (ref 1.8–7.7)
NEUTROPHILS NFR BLD AUTO: 87.7 % (ref 16–70)
PLATELET # BLD: 179 TH/MM3 (ref 150–450)
PMV BLD AUTO: 9.6 FL (ref 7–11)
RBC # BLD AUTO: 2.91 MIL/MM3 (ref 4.5–5.9)
SODIUM SERPL-SCNC: 141 MEQ/L (ref 136–145)
WBC # BLD AUTO: 8.1 TH/MM3 (ref 4–11)

## 2018-06-08 RX ADMIN — Medication SCH ML: at 19:54

## 2018-06-08 RX ADMIN — TORSEMIDE SCH MG: 20 TABLET ORAL at 09:11

## 2018-06-08 RX ADMIN — APIXABAN SCH MG: 5 TABLET, FILM COATED ORAL at 19:54

## 2018-06-08 RX ADMIN — BUDESONIDE AND FORMOTEROL FUMARATE DIHYDRATE SCH PUFF: 160; 4.5 AEROSOL RESPIRATORY (INHALATION) at 09:12

## 2018-06-08 RX ADMIN — POTASSIUM CHLORIDE SCH MEQ: 20 TABLET, EXTENDED RELEASE ORAL at 09:09

## 2018-06-08 RX ADMIN — IPRATROPIUM BROMIDE AND ALBUTEROL SULFATE SCH AMPULE: .5; 3 SOLUTION RESPIRATORY (INHALATION) at 16:14

## 2018-06-08 RX ADMIN — GUAIFENESIN SCH MG: 600 TABLET, EXTENDED RELEASE ORAL at 09:11

## 2018-06-08 RX ADMIN — CEFEPIME SCH MLS/HR: 2 INJECTION, POWDER, FOR SOLUTION INTRAVENOUS at 09:12

## 2018-06-08 RX ADMIN — IPRATROPIUM BROMIDE AND ALBUTEROL SULFATE SCH AMPULE: .5; 3 SOLUTION RESPIRATORY (INHALATION) at 04:00

## 2018-06-08 RX ADMIN — IPRATROPIUM BROMIDE AND ALBUTEROL SULFATE SCH AMPULE: .5; 3 SOLUTION RESPIRATORY (INHALATION) at 09:30

## 2018-06-08 RX ADMIN — CEFEPIME SCH MLS/HR: 2 INJECTION, POWDER, FOR SOLUTION INTRAVENOUS at 19:54

## 2018-06-08 RX ADMIN — ATORVASTATIN CALCIUM SCH MG: 10 TABLET, FILM COATED ORAL at 09:10

## 2018-06-08 RX ADMIN — Medication SCH ML: at 09:11

## 2018-06-08 RX ADMIN — APIXABAN SCH MG: 5 TABLET, FILM COATED ORAL at 09:11

## 2018-06-08 NOTE — HHI.IDPN
Subjective


Subjective


Remarks


feels better


he is afebrile


breathing, cough improved


Antibiotics


cefepime


Allergies:  


Coded Allergies:  


     No Known Allergies (Verified , 9/28/13)





Objective


.





Vital Signs








  Date Time  Temp Pulse Resp B/P (MAP) Pulse Ox O2 Delivery O2 Flow Rate FiO2


 


6/8/18 12:00 98.2 67 17 114/57 (76) 97   


 


6/8/18 09:31     98   21


 


6/8/18 08:00 97.8 74 16 113/66 (82) 97   


 


6/8/18 04:00 97.6 70 18 121/62 (81) 95   


 


6/8/18 00:00 97.7 73 18 115/57 (76) 95   


 


6/7/18 19:20 97.8 78 18 133/76 (95) 96   








.





Laboratory Tests








Test


  6/7/18


05:21 6/8/18


05:35


 


White Blood Count 5.9 TH/MM3  8.1 TH/MM3 


 


Red Blood Count 2.67 MIL/MM3  2.91 MIL/MM3 


 


Hemoglobin 9.2 GM/DL  9.9 GM/DL 


 


Hematocrit 26.7 %  29.1 % 


 


Mean Corpuscular Volume 100.1 FL  99.8 FL 


 


Mean Corpuscular Hemoglobin 34.4 PG  34.0 PG 


 


Mean Corpuscular Hemoglobin


Concent 34.4 % 


  34.0 % 


 


 


Red Cell Distribution Width 20.5 %  20.7 % 


 


Platelet Count 146 TH/MM3  179 TH/MM3 


 


Mean Platelet Volume 10.0 FL  9.6 FL 


 


Neutrophils (%) (Auto) 89.7 %  87.7 % 


 


Lymphocytes (%) (Auto) 7.6 %  8.6 % 


 


Monocytes (%) (Auto) 2.6 %  3.6 % 


 


Eosinophils (%) (Auto) 0.0 %  0.0 % 


 


Basophils (%) (Auto) 0.1 %  0.1 % 


 


Neutrophils # (Auto) 5.3 TH/MM3  7.1 TH/MM3 


 


Lymphocytes # (Auto) 0.4 TH/MM3  0.7 TH/MM3 


 


Monocytes # (Auto) 0.2 TH/MM3  0.3 TH/MM3 


 


Eosinophils # (Auto) 0.0 TH/MM3  0.0 TH/MM3 


 


Basophils # (Auto) 0.0 TH/MM3  0.0 TH/MM3 


 


CBC Comment DIFF FINAL  DIFF FINAL 


 


Differential Comment    








Laboratory Tests








Test


  6/8/18


05:34


 


Blood Urea Nitrogen 46 MG/DL 


 


Creatinine 1.56 MG/DL 


 


Random Glucose 154 MG/DL 


 


Calcium Level 8.6 MG/DL 


 


Sodium Level 141 MEQ/L 


 


Potassium Level 4.1 MEQ/L 


 


Chloride Level 107 MEQ/L 


 


Carbon Dioxide Level 22.6 MEQ/L 


 


Anion Gap 11 MEQ/L 


 


Estimat Glomerular Filtration


Rate 43 ML/MIN 


 








Microbiology








 Date/Time


Source Procedure


Growth Status


 


 


 6/7/18 21:45


Sputum Expectorated Sputum Acid Fast Stain - Final


NO ACID FAST BACILLI SEEN Resulted


 


 6/7/18 21:45


Sputum Expectorated Sputum Mycobacterial Culture


Pending Resulted





 6/7/18 21:45


Sputum Expectorated Sputum Gram Stain - Final Resulted


 


 6/7/18 21:45


Sputum Expectorated Sputum Sputum Culture - Preliminary


NO GROWTH IN 24 HOURS. Resulted








Imaging





Last Impressions








Chest X-Ray 6/5/18 1353 Signed





Impressions: 





 CONCLUSION: 





 Lungs are grossly clear.





  





 


 


Chest CT 6/5/18 0000 Addendum





Impressions: 





 CONCLUSION:





 1.  Patchy bilateral pulmonary infiltrates and emphysema identified.





 2.  There is a noncalcified pulmonary nodule in the left upper lobe abutting th





 e oblique fissure measuring 9 mm.





  





 








Physical Exam


 CONSTITUTIONAL/GENERAL: This is an adequately nourished patient, in no 

apparent distress.


TUBES/LINES/DRAINS:


SKIN: No jaundice, rashes, or lesions. Ecchymoses on upper extremities. No 

wounds seen anteriorly. Skin temperature appropriate. Not diaphoretic. 


HEAD: Atraumatic. Normocephalic.


EYES: Pupils equal and round and reactive. Extraocular motions intact. No 

scleral icterus. No injection or drainage. Fundi not examined.


ENT: Hearing grossly normal. Nose without bleeding or purulent drainage. Throat 

without visible erythema, exudates, masses, or lesions.


NECK: Trachea midline. Supple, nontender. No palpable thyroid enlargement or 

nodularity. 


CARDIOVASCULAR: Regular rate and rhythm without  gallops, or rubs.


Harsh 4/6 holosystolic  murmur on aorta


,No JVD. Peripheral pulses symmetric.


Pacer in place L chest  - looks OK


RESPIRATORY/CHEST: Symmetric, unlabored respirations. Diffuse b/l rhonchi to 

auscultation.   No wheezes .  


GASTROINTESTINAL: Abdomen soft, non-tender, nondistended. No hepato-splenomegaly

, or palpable masses. No guarding. Bowel sounds present.


GENITOURINARY: Without palpable bladder distension.  


MUSCULOSKELETAL: Extremities without clubbing, cyanosis, or edema. No joint 

tenderness or effusion noted. No calf tenderness. No mottling or clubbing.


LYMPHATICS: No palpable cervical or supraclavicular adenopathy.


NEUROLOGICAL: Awake and alert. Motor and sensory grossly within normal limits. 

Follows commands. Clear speech. Moves all extremities.


PSYCHIATRIC: No obvious anxiety/depression. no apparent hallucinations or other 

psychotic thought process.





Assessment & Plan


Remarks


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


   - not enough clinical and radiological data to confirm NTM disaes in the pt


PSAE PNA,  lenght  o/p treatment failure


    I to Levaquine, that could explain tx failure


Severe AS


- cont cefepime for PSAE x 2 weeks


- repeat CT in 1 month


repeat sputum cultures, including AFB


fu as o/p with Dr Hughes 


   call [phone] to schedule gary


OK to dc home


midline


OPAT forms filled out


pt counselled on tx plan





Discussed Condition With Dr Bruce


pt











Andria Cortes MD Jun 8, 2018 16:22

## 2018-06-08 NOTE — HHI.FF
Face to Face Verification


Diagnosis:  


(1) Bronchiectasis


(2) Chronic atrial fibrillation


(3) Aortic stenosis


(4) Anemia


Physical Therapy


Order:  Evaluate and Treat





Home Health Nursing








Order: Signs/symptoms of disease process





 Medication education-adverse effect





 Nursing assessment with vital signs





 IV medication administration

















I have seen patient Curtis Velazco on 6/8/18. My clinical findings support the 

need for the requested home health care services because:








 Patient has SOB





 Deconditioned w/ increased weakness





 Med compliance is questionable





 Infection w/ risk of complications





 Injectable med education/admin














I certify that my clinical findings support that this patient is homebound 

because:








 Hx COPD- exertion dyspnea/weakness





 Unable to use public transportation

















Liam Esparza MD Jun 8, 2018 15:27

## 2018-06-08 NOTE — HHI.FF
__________________________________________________





Infusion Therapy


Location of Infusion Therapy:  Ambulatory Infusion Therapy Order


Patient Information


Patient Weight


119.5 kg


Diagnosis:  


Coded Allergies:  


     No Known Allergies (Verified , 9/28/13)





Administer Medication


Cefepime


2 grams IV


q 12 hours


Start Treatment:  Jun 8, 2018


Stop Treatment:  Jun 20, 2018





Additional Information


Venous access:  PICC Line


Additional Instructions





[x] Peripheral flush and dressing changes per protocol


[x] Implanted port and central :


* Implanted port: 10 ml Normal Saline followed by 5 ml Heparin 100 units/ml 

Heparin flush


   after each use and monthly to maintain.


   [] May leave port accessed during therapy.


   [] May leave peripheral site accessed for duration of therapy.





[x] If patient has SOB or respiratory distress, check oxygen saturation. If 

less than 90% or clinical signs of respiratory distress, administer oxygen at 2 

L/min. via nasal cannula and notify physician.





[x] Anaphylaxis/Reaction orders:


* Stop infusion.


* Keep IV line open with saline flush.


* Notify physician.


* Monitor vital signs every 15 minutes until symptoms resolve.


* Check Oxygen saturation; Oxygen at 2 L/min. via nasal cannula if less than 90%

 or clinical signs of respiratory distress.


* Administer diphenhydramine (Benadryl) 25 mg IV STAT, (unless patient has 

received as pre-med). May repeat once, if necessary.


* Solu-Cortef 250 mg IVP over 30-60 seconds, use 100 mg vials for each 

dissolution.


* Epinephrine (1mg/1 ml) 0.3 mg subcutaneously or IVP now with any signs of 

respiratory distress.


* Check with physician for new additional pre-med orders if patient is re-

challenged or re-treated.


[x] May remove PICC line when treatment complete, after confirming with 

Physician.


[x] If the patient is admitted to the hospital, the ED, or transferred via [facility]

, complete transfer form including medication reconciliation order sheet.





Laboratory Tests


Weekly Labs:  CBC w/diff, Creatinine











Andria Cortes MD Jun 8, 2018 16:08

## 2018-06-08 NOTE — PD.CARD.PN
Subjective


Subjective Remarks


The patient denies SOB, CP, lightheadedness, edema or palpitations. Had small 

amount of greyish sputum this morning sent for culture. Per RN, easy bleeding 

at IV site.





Objective


Medications





Current Medications








 Medications


  (Trade)  Dose


 Ordered  Sig/Mina


 Route  Start Time


 Stop Time Status Last Admin


 


  (NS Flush)  2 ml  UNSCH  PRN


 IV FLUSH  6/5/18 18:15


     


 


 


  (NS Flush)  2 ml  BID


 IV FLUSH  6/5/18 21:00


    6/8/18 09:11


 


 


  (Tylenol)  650 mg  Q4H  PRN


 PO  6/5/18 18:15


     


 


 


  (Narcan Inj)  0.4 mg  UNSCH  PRN


 IV PUSH  6/5/18 18:15


     


 


 


  (Milk Of


 Magnesia Liq)  30 ml  Q12H  PRN


 PO  6/5/18 18:15


     


 


 


  (Senokot)  17.2 mg  Q12H  PRN


 PO  6/5/18 18:15


     


 


 


  (Dulcolax Supp)  10 mg  DAILY  PRN


 RECTAL  6/5/18 18:15


     


 


 


  (Mucinex Er)  600 mg  DAILY


 PO  6/6/18 09:00


    6/8/18 09:11


 


 


  (KCl)  20 meq  DAILY


 PO  6/6/18 09:00


    6/8/18 09:09


 


 


  (Demadex)  20 mg  DAILY


 PO  6/6/18 09:00


    6/8/18 09:11


 


 


 Non-Formulary


 Medication  1 patch  Q7D


 T-DERMAL  6/5/18 18:30


   Future Hold  


 


 


  (Symbicort


 160-4.5 Mcg Inh)  1 puff  BID


 INH  6/5/18 21:00


    6/8/18 09:12


 


 


  (Lipitor)  10 mg  DAILY


 PO  6/6/18 09:00


    6/8/18 09:10


 


 


 Non-Formulary


 Medication  1 puff  DAILY


 INH  6/6/18 09:00


   Future Hold  


 


 


  (Duoneb Neb)  1 ampule  Q6HR  NEB


 NEB  6/5/18 22:00


    6/8/18 09:30


 


 


 Cefepime HCl 2000


 mg/Sodium Chloride  100 ml @ 


 200 mls/hr  Q12H


 IV  6/7/18 09:00


    6/8/18 09:12


 


 


  (Deltasone)  20 mg  BID


 PO  6/7/18 09:00


    6/8/18 09:10


 


 


  (Eliquis)  2.5 mg  BID


 PO  6/7/18 11:00


    6/8/18 09:11


 








Vital Signs / I&O





Vital Signs








  Date Time  Temp Pulse Resp B/P (MAP) Pulse Ox O2 Delivery O2 Flow Rate FiO2


 


6/8/18 12:00 98.2 67 17 114/57 (76) 97   


 


6/8/18 09:31     98   21


 


6/8/18 08:00 97.8 74 16 113/66 (82) 97   


 


6/8/18 04:00 97.6 70 18 121/62 (81) 95   


 


6/8/18 00:00 97.7 73 18 115/57 (76) 95   


 


6/7/18 19:20 97.8 78 18 133/76 (95) 96   


 


6/7/18 15:50 97.1 57 20 113/59 (77) 95   














I/O      


 


 6/7/18 6/7/18 6/7/18 6/8/18 6/8/18 6/8/18





 07:00 15:00 23:00 07:00 15:00 23:00


 


Intake Total   600 ml 100 ml  


 


Output Total   800 ml   


 


Balance   -200 ml 100 ml  


 


      


 


Intake Oral   600 ml 100 ml  


 


Output Urine Total   800 ml   


 


# Voids 3  1 1  


 


# Bowel Movements    0  








Physical Exam


GENERAL: Elderly male


SKIN: Warm and dry.


HEAD: Normocephalic.


EYES: No scleral icterus. No injection or drainage. 


NECK: Supple, trachea midline. No JVD or lymphadenopathy.


CARDIOVASCULAR: Irreg irreg, systolic murmur, ICD or PPM left chest 


RESPIRATORY: Coarse rhonchi global


GASTROINTESTINAL: Abdomen soft, non-tender, nondistended. 


MUSCULOSKELETAL: No cyanosis, or edema. 


BACK: Nontender without obvious deformity. No CVA tenderness.





Laboratory





Laboratory Tests








Test


  6/8/18


05:34 6/8/18


05:35


 


Blood Urea Nitrogen 46 MG/DL  


 


Creatinine 1.56 MG/DL  


 


Random Glucose 154 MG/DL  


 


Calcium Level 8.6 MG/DL  


 


Sodium Level 141 MEQ/L  


 


Potassium Level 4.1 MEQ/L  


 


Chloride Level 107 MEQ/L  


 


Carbon Dioxide Level 22.6 MEQ/L  


 


Anion Gap 11 MEQ/L  


 


Estimat Glomerular Filtration


Rate 43 ML/MIN 


  


 


 


White Blood Count  8.1 TH/MM3 


 


Red Blood Count  2.91 MIL/MM3 


 


Hemoglobin  9.9 GM/DL 


 


Hematocrit  29.1 % 


 


Mean Corpuscular Volume  99.8 FL 


 


Mean Corpuscular Hemoglobin  34.0 PG 


 


Mean Corpuscular Hemoglobin


Concent 


  34.0 % 


 


 


Red Cell Distribution Width  20.7 % 


 


Platelet Count  179 TH/MM3 


 


Mean Platelet Volume  9.6 FL 


 


Neutrophils (%) (Auto)  87.7 % 


 


Lymphocytes (%) (Auto)  8.6 % 


 


Monocytes (%) (Auto)  3.6 % 


 


Eosinophils (%) (Auto)  0.0 % 


 


Basophils (%) (Auto)  0.1 % 


 


Neutrophils # (Auto)  7.1 TH/MM3 


 


Lymphocytes # (Auto)  0.7 TH/MM3 


 


Monocytes # (Auto)  0.3 TH/MM3 


 


Eosinophils # (Auto)  0.0 TH/MM3 


 


Basophils # (Auto)  0.0 TH/MM3 


 


CBC Comment  DIFF FINAL 


 


Differential Comment   











Assessment and Plan


Assessment and Plan


Chronic atrial fibrillation on Eliquis 


Moderate to severe aortic stenosis


Macrocytic anemia, MDS, s/p transfusion PRBC


Bronchiectasis


Chronic pseudomonas 


Mycobacterium abscessus 


Weigh loss due to above


CKD Cr > 1.5





Plan:


Continue decreased Eliquis 2.5 mg BID on the basis of age > 80 and Cr > 1.5. 

The patient has easy bleeding. Hx of MDS, but recently required blood 

transfusion. Hx of colonoscopy a few years ago. Patient does not think he had 

an endoscopy. Will obtain occult stool. If he is discharged before order is 

completed, it can be done outpatient. 


Continue rate control therapy


Continue diuresis 


The patient is asymptomatic of AS. The patient is clear for discharge from 

cardiovascular standpoint


The patient was seen and evaluated by Dr Marrero who completed face to face 

encounter and physical exam and participated in evaluation and management.











Aide Stephen Jun 8, 2018 14:14

## 2018-06-08 NOTE — HHI.PR
Subjective


Remarks


Patient is ambulating independently in the room.  He reports mild cough.  He is 

not producing any sputum at this time.  Denies any wheezing.  Denies dyspnea on 

exertion.





Appetite good.  Bowels are moving.  Urine output is good.





Blood pressure under adequate control.  Hemoglobin 9.9 this morning.








Current Medications








 Medications


  (Trade)  Dose


 Ordered  Sig/Mina


 Route  Start Time


 Stop Time Status Last Admin


 


  (NS Flush)  2 ml  UNSCH  PRN


 IV FLUSH  6/5/18 18:15


     


 


 


  (NS Flush)  2 ml  BID


 IV FLUSH  6/5/18 21:00


    6/8/18 09:11


 


 


  (Tylenol)  650 mg  Q4H  PRN


 PO  6/5/18 18:15


     


 


 


  (Narcan Inj)  0.4 mg  UNSCH  PRN


 IV PUSH  6/5/18 18:15


     


 


 


  (Milk Of


 Magnesia Liq)  30 ml  Q12H  PRN


 PO  6/5/18 18:15


     


 


 


  (Senokot)  17.2 mg  Q12H  PRN


 PO  6/5/18 18:15


     


 


 


  (Dulcolax Supp)  10 mg  DAILY  PRN


 RECTAL  6/5/18 18:15


     


 


 


  (Mucinex Er)  600 mg  DAILY


 PO  6/6/18 09:00


    6/8/18 09:11


 


 


  (KCl)  20 meq  DAILY


 PO  6/6/18 09:00


    6/8/18 09:09


 


 


  (Demadex)  20 mg  DAILY


 PO  6/6/18 09:00


    6/8/18 09:11


 


 


 Non-Formulary


 Medication  1 patch  Q7D


 T-DERMAL  6/5/18 18:30


   Future Hold  


 


 


  (Symbicort


 160-4.5 Mcg Inh)  1 puff  BID


 INH  6/5/18 21:00


    6/8/18 09:12


 


 


  (Lipitor)  10 mg  DAILY


 PO  6/6/18 09:00


    6/8/18 09:10


 


 


 Non-Formulary


 Medication  1 puff  DAILY


 INH  6/6/18 09:00


   Future Hold  


 


 


  (Duoneb Neb)  1 ampule  Q6HR  NEB


 NEB  6/5/18 22:00


    6/8/18 09:30


 


 


 Cefepime HCl 2000


 mg/Sodium Chloride  100 ml @ 


 200 mls/hr  Q12H


 IV  6/7/18 09:00


    6/8/18 09:12


 


 


  (Deltasone)  20 mg  BID


 PO  6/7/18 09:00


    6/8/18 09:10


 


 


  (Eliquis)  2.5 mg  BID


 PO  6/7/18 11:00


    6/8/18 09:11


 











Objective





Vital Signs








  Date Time  Temp Pulse Resp B/P (MAP) Pulse Ox O2 Delivery O2 Flow Rate FiO2


 


6/8/18 12:00 98.2 67 17 114/57 (76) 97   


 


6/8/18 09:31     98   21


 


6/8/18 08:00 97.8 74 16 113/66 (82) 97   


 


6/8/18 04:00 97.6 70 18 121/62 (81) 95   


 


6/8/18 00:00 97.7 73 18 115/57 (76) 95   


 


6/7/18 19:20 97.8 78 18 133/76 (95) 96   


 


6/7/18 15:50 97.1 57 20 113/59 (77) 95   














I/O      


 


 6/7/18 6/7/18 6/7/18 6/8/18 6/8/18 6/8/18





 07:00 15:00 23:00 07:00 15:00 23:00


 


Intake Total   600 ml 100 ml  


 


Output Total   800 ml   


 


Balance   -200 ml 100 ml  


 


      


 


Intake Oral   600 ml 100 ml  


 


Output Urine Total   800 ml   


 


# Voids 3  1 1  


 


# Bowel Movements    0  








Cardiovascular: Regular rate and rhythm with systolic murmur


Lungs: Minimal upper airway rhonchi but no wheezes or rales.  Mildly reduced 

air exchange bilaterally.


Extremities: No edema.  No calf tenderness


Result Diagram:  


6/8/18 0535                                                                    

            6/8/18 0534








Assessment and Plan


Problem List:  


(1) Bronchiectasis


ICD Codes:  J47.9 - Bronchiectasis, uncomplicated


Status:  Chronic


Plan:  The patient has been receiving cefepime IV.  I spoke with Dr. Cortes, 

infectious disease consultant, who recommended that the patient continue with 

IV cefepime for 2-4 more weeks.  She recommends an outpatient infectious 

disease consultation.  This will be arranged through my office.





The patient will have a PICC line placed for IV access for IV antibiotics.  

Will arrange for home health to administer IV cefepime daily for 2 weeks.  I am 

awaiting orders from Dr. Cortes for the IV antibiotics so the patient can be 

discharged home today.





I spoke with Dr. Cortes in reference to the Mycobacterium abscesses in the 

patient's sputum.  Diagnosis  of Mycobacterium abscessus infection requires to 

positive cultures and radiology and clinical correlation.  Dr. Cortes states 

that she consulted with radiology and the CT scan is not consistent with a 

mycobacterial infection.  She does recommend repeat CT scan in 1 month.  Will 

arrange this as an outpatient.





(2) Anemia


ICD Codes:  D64.9 - Anemia, unspecified


Status:  Chronic


Plan:  Status post transfusion of 2 units of packed red blood cells.  

Hemoglobin is 9.9 today.  Patient will follow up with Dr. Rust, his 

hematologist, as an outpatient.  He was receiving Procrit injections prior to 

admission.





(3) Chronic atrial fibrillation


ICD Codes:  I48.2 - Chronic atrial fibrillation


Status:  Chronic


Plan:  Ventricular rate is well controlled.  Patient is receiving Eliquis.  

Cardiology has reduced Eliquis to 2.5 mg twice a day.  This was discussed with 

the patient and he is aware of the change in dosing.





(4) Aortic stenosis


ICD Codes:  I35.0 - Nonrheumatic aortic (valve) stenosis


Status:  Chronic


Plan:  The patient has moderate to severe aortic stenosis.  Currently stable.  

Cardiology has deferred any intervention for the aortic valve until patient's 

pulmonary status has improved





(5) Generalized weakness


ICD Codes:  R53.1 - Weakness


Status:  Resolved


Plan:  Improved.  Patient ambulating with walker.  Will continue with physical 

therapy.  Patient has been receiving home health physical therapy at home.





(6) Fever


ICD Codes:  R50.9 - Fever, unspecified


Status:  Resolved


Plan:  The patient has remained afebrile.  





Discharge Planning


Plan to discharge patient home later today once PICC line is placed and IV 

therapy orders have been entered.  Have discussed the plan with case management 

and they will assist with arrangements.





Problem Qualifiers





(1) Bronchiectasis:  


Qualified Codes:  J47.0 - Bronchiectasis with acute lower respiratory infection


(2) Anemia:  


Qualified Codes:  D64.9 - Anemia, unspecified


(3) Aortic stenosis:  


Qualified Codes:  I35.0 - Nonrheumatic aortic (valve) stenosis


(4) Fever:  


Qualified Codes:  R50.9 - Fever, unspecified








Liam Esparza MD Jun 8, 2018 15:20

## 2022-05-04 NOTE — HHI.PR
Subjective


Remarks


ALERT'


NAD





Objective





Vital Signs








  Date Time  Temp Pulse Resp B/P (MAP) Pulse Ox O2 Delivery O2 Flow Rate FiO2


 


6/8/18 16:00 97.4 72 17 132/72 (92) 97   


 


6/8/18 12:00 98.2 67 17 114/57 (76) 97   


 


6/8/18 09:31     98   21


 


6/8/18 08:00 97.8 74 16 113/66 (82) 97   


 


6/8/18 04:00 97.6 70 18 121/62 (81) 95   


 


6/8/18 00:00 97.7 73 18 115/57 (76) 95   


 


6/7/18 19:20 97.8 78 18 133/76 (95) 96   














I/O      


 


 6/7/18 6/7/18 6/7/18 6/8/18 6/8/18 6/8/18





 07:00 15:00 23:00 07:00 15:00 23:00


 


Intake Total   600 ml 100 ml  


 


Output Total   800 ml   


 


Balance   -200 ml 100 ml  


 


      


 


Intake Oral   600 ml 100 ml  


 


Output Urine Total   800 ml   


 


# Voids 3  1 1  


 


# Bowel Movements    0  








Result Diagram:  


6/8/18 0535                                                                    

            6/8/18 0534





Objective Remarks


GENERAL: 


SKIN: Warm and dry.


HEAD: Atraumatic. Normocephalic. 


EYES: Pupils equal and round. No scleral icterus. No injection or drainage. 


ENT: No nasal bleeding or discharge.  Mucous membranes pink and moist.


NECK: Trachea midline. No JVD. 


CARDIOVASCULAR: Regular rate and rhythm.  


RESPIRATORY: No accessory muscle use. SCATTERED RHONCHI AT BASIS to 

auscultation. Breath sounds equal bilaterally. 


GASTROINTESTINAL: Abdomen soft, non-tender, nondistended. Hepatic and splenic 

margins not palpable. 


MUSCULOSKELETAL: Extremities without clubbing, cyanosis, or edema. No obvious 

deformities. 


NEUROLOGICAL: Awake and alert. No obvious cranial nerve deficits.  Motor 

grossly within normal limits. Five out of 5 muscle strength in the arms and 

legs.  Normal speech.


PSYCHIATRIC: Appropriate mood and affect; insight and judgment normal.





Assessment and Plan


Assessment and Plan


IMP





PNA


H/O ATYPICAL TB


JJ





PLAN





O2 AS NEEDED


ANTIBX PER Ton Mart MD Jun 8, 2018 18:34 What Type Of Note Output Would You Prefer (Optional)?: Standard Output Is The Patient Presenting As Previously Scheduled?: Yes How Severe Is Your Rash?: mild Is This A New Presentation, Or A Follow-Up?: Rash

## 2025-02-14 NOTE — RADRPT
4 Eyes Skin Assessment     NAME:  Aden Ugarte  YOB: 1973  MEDICAL RECORD NUMBER:  [id number]    The patient is being assess for  Admission    I agree that 2 RN's have performed a thorough Head to Toe Skin Assessment on the patient. ALL assessment sites listed below have been assessed.      Areas assessed by both nurses:    Head, Face, Ears, Shoulders, Back, Chest, Arms, Elbows, Hands, Sacrum. Buttock, Coccyx, Ischium, Legs. Feet and Heels, and Under Medical Devices         Does the Patient have a Wound? No noted wound(s)       Teja Prevention initiated:  No   Wound Care Orders initiated:  No    Pressure Injury (Stage 3,4, Unstageable, DTI, NWPT, and Complex wounds) if present place referral/consult order under :: No    New and Established Ostomies if present place consult order under : No      Nurse 1 eSignature: Electronically signed by SWAPNIL ALICIA LPN on 2/13/25 at 11:27 PM EST    **SHARE this note so that the co-signing nurse is able to place an eSignature**    Nurse 2 eSignature: Electronically signed by Alex Valverde RN on 2/14/25 at 3:22 AM EST       EXAM DATE:  6/5/2018 7:11 PM EDT

AGE/SEX:        83 years / Male



INDICATIONS:  Shortness of breath, fever.



CLINICAL DATA:  This is the patient's initial encounter. Patient reports that signs and symptoms have
 been present for 1 day and indicates a pain score of 0/10. 

                                                                          

MEDICAL/SURGICAL HISTORY:   Cardiovascular disease.  Emphysema.  Anemia. Pacemaker.



RADIATION DOSE:  12.90 CTDI (mGy)







COMPARISON:      [facility], CT CHEST W/O CONTRAST, 3/16/2018.  . 





TECHNIQUE:  Multiple contiguous axial images were obtained through the chest without contrast.  Image
s were obtained in suspended respiration using multiple row detector helical technique.  Using automa
kvng exposure control and adjustment of the mA and/or kV according to patient size, radiation dose was
 kept as low as reasonably achievable to obtain optimal diagnostic quality images.



FINDINGS: 

Atherosclerotic calcification of the aorta and coronary arteries. Calcified granulomas in the spleen 
are present. No pleural or pericardial effusions are identified. There are subcentimeter nodes in the
 mediastinum, none of which are pathologically enlarged. Review of lung windows demonstrate a calcifi
ed granuloma in the left upper lobe and patchy groundglass infiltrate in the left upper lobe and more
 confluent consolidation in both lower lobes. There is patchy right upper lobe infiltrate mild emphys
ematous changes are appreciated. There are degenerative changes of the spine noted. Aortic valve pros
thesis. There are scattered mild compression deformities seen within the spine including a moderate c
ompression deformity at T12. 9 mm noncalcified left upper lobe pulmonary nodule present. This is incr
eased in size from March.



CONCLUSION:

1.  Patchy bilateral pulmonary infiltrates and emphysema identified.

2.  There is a noncalcified pulmonary nodule in the left upper lobe abutting the oblique fissure lisette
uring 9 mm.



Electronically signed by: Hiro Gleason MD  6/5/2018 7:40 PM EDT